# Patient Record
Sex: FEMALE | Race: WHITE
[De-identification: names, ages, dates, MRNs, and addresses within clinical notes are randomized per-mention and may not be internally consistent; named-entity substitution may affect disease eponyms.]

---

## 2019-01-13 ENCOUNTER — HOSPITAL ENCOUNTER (EMERGENCY)
Dept: HOSPITAL 97 - ER | Age: 84
Discharge: HOME | End: 2019-01-13
Payer: COMMERCIAL

## 2019-01-13 VITALS — DIASTOLIC BLOOD PRESSURE: 68 MMHG | SYSTOLIC BLOOD PRESSURE: 127 MMHG

## 2019-01-13 VITALS — TEMPERATURE: 97.9 F | OXYGEN SATURATION: 100 %
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
## 2019-01-13 DIAGNOSIS — F32.9: ICD-10-CM

## 2019-01-13 DIAGNOSIS — Z86.73: ICD-10-CM
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
## 2019-01-13 DIAGNOSIS — Z88.6: ICD-10-CM
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
## 2019-01-13 DIAGNOSIS — S10.11XA: Primary | ICD-10-CM

## 2019-01-13 LAB
BUN BLD-MCNC: 17 MG/DL (ref 7–18)
GLUCOSE SERPLBLD-MCNC: 84 MG/DL (ref 74–106)
HCT VFR BLD CALC: 39.4 % (ref 36–45)
LYMPHOCYTES # SPEC AUTO: 1.4 K/UL (ref 0.7–4.9)
PMV BLD: 8.9 FL (ref 7.6–11.3)
POTASSIUM SERPL-SCNC: 3.8 MMOL/L (ref 3.5–5.1)
RBC # BLD: 4.21 M/UL (ref 3.86–4.86)
TROPONIN (EMERG DEPT USE ONLY): < 0.02 NG/ML (ref 0–0.04)

## 2019-01-13 PROCEDURE — 80048 BASIC METABOLIC PNL TOTAL CA: CPT

## 2019-01-13 PROCEDURE — 71045 X-RAY EXAM CHEST 1 VIEW: CPT

## 2019-01-13 PROCEDURE — 99284 EMERGENCY DEPT VISIT MOD MDM: CPT

## 2019-01-13 PROCEDURE — 93005 ELECTROCARDIOGRAM TRACING: CPT

## 2019-01-13 PROCEDURE — 84484 ASSAY OF TROPONIN QUANT: CPT

## 2019-01-13 PROCEDURE — 36415 COLL VENOUS BLD VENIPUNCTURE: CPT

## 2019-01-13 PROCEDURE — 85025 COMPLETE CBC W/AUTO DIFF WBC: CPT

## 2019-01-13 NOTE — XMS REPORT
Clinical Summary

 Created on:2019



Patient:Treva Gómez

Sex:Female

:1934

External Reference #:[id number]





Demographics







 Address  [address] [place] [address]

 

 Home Phone  [phone]

 

 Preferred Language  English

 

 Marital Status  Unknown

 

 Adventism Affiliation  Unknown

 

 Race  White

 

 Ethnic Group  Not  or 









Author







 Organization  [facility]

 

 Address  [address] [place] [address]









Support







 Name  Relationship  Address  Phone

 

 Khris Gómez  [address] [facility] [phone]



     [place] [address]  









Care Team Providers







 Name  Role  Phone

 

 Unavailable  Primary Care Provider  Unavailable









Allergies







 Active Allergy  Reactions  Severity  Noted Date  Comments

 

 Salicylates  Other (See Comments)    2016  Pt stated "bleeding"

 

 Rosuvastatin      2016  

 

 Desvenlafaxine      2016  

 

 Ezetimibe-Simvastatin      2016  







Medications







 Medication  Sig  Dispensed  Refills  Start Date  End Date  Status

 

 meclizine (ANTIVERT) 25  Take 25 mg by    0      Active



 MG tablet  mouth daily as          



   needed for          



   Dizziness.          

 

 BIOTIN ORAL  Take by mouth.    0      Active

 

 conjugated estrogens  Place vaginally.    0      Active



 (PREMARIN) 0.625 mg/gram            



 vaginal cream            

 

 DOCOSAHEXANOIC ACID/EPA  Take by mouth.    0      Active



 (FISH OIL ORAL)            

 

 GLUCOSAMINE/CHONDROITIN  Take by mouth.    0      Active



 SULF A            



 (GLUCOSAMINE-CHONDROITIN            



 ORAL)            

 

 coenzyme Q10 (COQ-10)  Take by mouth    0      Active



 100 mg capsule  daily.          

 

 CHOLECALCIFEROL, VITAMIN  Take by mouth.    0      Active



 D3, (VITAMIN D3 ORAL)            

 

 CALCIUM-MAGNESIUM-ZINC  Take by mouth.    0      Active



 ORAL            

 

 nutritional supplement -  Take by mouth.    0      Active



 fiber (JUICE PLUS) Liqd            







Active Problems







 Problem  Noted Date

 

 CVA (cerebral vascular accident)  2016







Social History







 Tobacco Use  Types  Packs/Day  Years Used  Date

 

 Never Smoker        









 Sex Assigned at Birth  Date Recorded

 

 Not on file  









 Job Start Date  Occupation  Industry

 

 Not on file  Not on file  Not on file









 Travel History  Travel Start  Travel End









 No recent travel history available.







Last Filed Vital Signs

Not on file



Plan of Treatment

Not on file



Results

Not on fileafter 2018



Insurance







 Payer  Benefit Plan / Group  Subscriber ID  Type  Phone  Address

 

 MEDICARE  MEDICARE A B  xxxxxxxxxx  Medicare    

 

 [employer] - [employer] C[employer]  [employer] TRS RETIREES  xxxxxxxxxx  HMO/POS    









 Guarantor Name  Account Type  Relation to  Date of  Phone  Billing Address



     Patient  Birth    

 

 Treva Gómez  Personal/Family  Self  1934  [phone]  [address] [place] [address] 







         ([place] [address]

## 2019-01-13 NOTE — RAD REPORT
EXAM DESCRIPTION:  RAD - Chest Single View - 1/13/2019 11:41 am

 

CLINICAL HISTORY:  CHEST PAIN

Chest pain.

 

COMPARISON:  Chest Single View dated 3/28/2017; Chest Single View dated 9/2/2016; CHEST PA AND LAT 2 
VIEW dated 6/12/2013; CHEST SINGLE VIEW dated 8/10/2012

 

FINDINGS:  Portable technique limits examination quality.

 

Mild emphysematous changes present throughout the lungs. The heart is normal in size. No displaced fr
actures.

 

IMPRESSION:  Mild COPD.

## 2019-01-13 NOTE — EDPHYS
Physician Documentation                                                                           

 [facility]                                                                

Name: Treva Gómez                                                                                

Age: 84 yrs                                                                                       

Sex: Female                                                                                       

: 1934                                                                                   

MRN: [id number]                                                                                   

Arrival Date: 2019                                                                          

Time: 10:20                                                                                       

Account#: [id number]                                                                            

Bed 4                                                                                             

Private MD: Shubham Perez V                                                                      

ED Physician Rafael Crespo                                                                       

HPI:                                                                                              

                                                                                             

12:46 This 84 yrs old  Female presents to ER via Ambulatory with complaints of       gs  

      Foreign Body In Throat.                                                                     

12:46 Onset: gradually, yesterday. Associated signs and symptoms: Pertinent positives: chest  gs  

      pain, Pertinent negatives: shortness of breath. The chest pain is described as dull.        

      Duration: The patient or guardian reports a single episode, that is now resolved.           

      Modifying factors: The symptoms are alleviated by nothing. the symptoms are aggravated      

      by nothing. Severity of pain: At its worst the pain was moderate in the emergency           

      department the pain has resolved. started after taking a pill felt like didn't go down      

      pain throat and chest resolved her for evaluation.                                          

                                                                                                  

Historical:                                                                                       

- Allergies:                                                                                      

10:37 Aspirin;                                                                                aj1 

- Home Meds:                                                                                      

10:37 Aspirin Oral [Active]; atorvastatin 80 mg Oral tab 1 tab once daily [Active]; biotin    aj1 

      Oral [Active]; Calcium with Magnesium [Active]; CoQ-10 Oral [Active]; Fish Oil Oral         

      [Active]; Glucosamine Oral [Active]; Meclizine Oral [Active]; Mirtazapine Oral              

      [Active]; Premarin [Active]; Vitamin D Oral [Active];                                       

- PMHx:                                                                                           

10:37 CVA; Depression; [place]; Vertigo;                                                          aj1 

                                                                                                  

- Immunization history:: Flu vaccine is up to date.                                               

- Social history:: Smoking status: Patient/guardian denies using tobacco.                         

- Ebola Screening: : Patient denies travel to an Ebola-affected area in the 21 days               

  before illness onset.                                                                           

                                                                                                  

                                                                                                  

ROS:                                                                                              

12:46 All other systems are negative.                                                         gs  

                                                                                                  

Exam:                                                                                             

12:46 Head/Face:  Normocephalic, atraumatic. Eyes:  Pupils equal round and reactive to light, gs  

      extra-ocular motions intact.  Lids and lashes normal.  Conjunctiva and sclera are           

      non-icteric and not injected.  Cornea within normal limits.  Periorbital areas with no      

      swelling, redness, or edema. ENT:  Nares patent. No nasal discharge, no septal              

      abnormalities noted.  Tympanic membranes are normal and external auditory canals are        

      clear.  Oropharynx with no redness, swelling, or masses, exudates, or evidence of           

      obstruction, uvula midline.  Mucous membranes moist. Neck:  Trachea midline, no             

      thyromegaly or masses palpated, and no cervical lymphadenopathy.  Supple, full range of     

      motion without nuchal rigidity, or vertebral point tenderness.  No Meningismus.             

      Chest/axilla:  Normal chest wall appearance and motion.  Nontender with no deformity.       

      No lesions are appreciated. Cardiovascular:  Regular rate and rhythm with a normal S1       

      and S2.  No gallops, murmurs, or rubs.  Normal PMI, no JVD.  No pulse deficits.             

      Respiratory:  Lungs have equal breath sounds bilaterally, clear to auscultation and         

      percussion.  No rales, rhonchi or wheezes noted.  No increased work of breathing, no        

      retractions or nasal flaring. Abdomen/GI:  Soft, non-tender, with normal bowel sounds.      

      No distension or tympany.  No guarding or rebound.  No evidence of tenderness               

      throughout. Back:  No spinal tenderness.  No costovertebral tenderness.  Full range of      

      motion. Skin:  Warm, dry with normal turgor.  Normal color with no rashes, no lesions,      

      and no evidence of cellulitis. MS/ Extremity:  Pulses equal, no cyanosis.                   

      Neurovascular intact.  Full, normal range of motion. Neuro:  Awake and alert, GCS 15,       

      oriented to person, place, time, and situation.  Cranial nerves II-XII grossly intact.      

      Motor strength 5/5 in all extremities.  Sensory grossly intact.  Cerebellar exam            

      normal.  Normal gait.                                                                       

12:46 Constitutional: The patient appears alert, awake.                                           

12:46 ECG was reviewed by the Attending Physician.                                                

                                                                                                  

Vital Signs:                                                                                      

10:37  / 87; Pulse 76; Resp 18; Temp 97.9; Pulse Ox 100% on R/A; Weight 61.23 kg (R);   aj1 

      Height 5 ft. 4 in. (162.56 cm) (R); Pain 3/10;                                              

12:04  / 68; Pulse 68; Resp 18; Pulse Ox 100% on R/A;                                   ph  

10:37 Body Mass Index 23.17 (61.23 kg, 162.56 cm)                                             aj1 

                                                                                                  

MDM:                                                                                              

11:21 Patient medically screened.                                                             gs  

12:46 Differential diagnosis: coronary artery disease chest wall pain, pharyngeal abrasion.   gs  

      Data reviewed: vital signs, nurses notes. Counseling: I had a detailed discussion with      

      the patient and/or guardian regarding: the historical points, exam findings, and any        

      diagnostic results supporting the discharge/admit diagnosis, the need for outpatient        

      follow up. Response to treatment: the patient's symptoms have resolved after treatment,     

      and as a result, I will discharge patient.                                                  

                                                                                                  

                                                                                             

11:22 Order name: Basic Metabolic Panel; Complete Time: 12:45                                   

                                                                                             

11:22 Order name: CBC with Diff; Complete Time: 12:45                                           

                                                                                             

11:22 Order name: Troponin (emerg Dept Use Only); Complete Time: 12:45                          

                                                                                             

11:22 Order name: XRAY Chest (1 view); Complete Time: 11:56                                     

                                                                                             

11:22 Order name: Cardiac monitoring; Complete Time: 11:51                                      

                                                                                             

11:22 Order name: EKG - Nurse/Tech; Complete Time: 12:24                                        

                                                                                             

11:22 Order name: IV Saline Lock; Complete Time: 11:                                          

                                                                                             

11:22 Order name: Labs collected and sent; Complete Time: 11:52                                 

                                                                                             

11:22 Order name: O2 Per Protocol; Complete Time: :52                                         

                                                                                             

11:22 Order name: O2 Sat Monitoring; Complete Time: :                                       

                                                                                                  

EC:46 Rate is 70 beats/min. Rhythm is regular. ME interval is normal. QRS interval is normal. gs  

      T waves are Normal. No ST changes noted. Clinical impression: Normal ECG. Interpreted       

      by me.                                                                                      

                                                                                                  

Administered Medications:                                                                         

No medications were administered                                                                  

                                                                                                  

                                                                                                  

Disposition:                                                                                      

19 13:01 Discharged to Home. Impression: pharyngeal abrasion.                               

- Condition is Stable.                                                                            

- Discharge Instructions: Swallowed Foreign Body, Adult.                                          

                                                                                                  

- Medication Reconciliation Form, Thank You Letter, Antibiotic Education, Prescription            

  Opioid Use form.                                                                                

- Follow up: Private Physician; When: 2 - 3 days; Reason: Re-evaluation by your                   

  physician.                                                                                      

                                                                                                  

                                                                                                  

                                                                                                  

Signatures:                                                                                       

Dispatcher [employer]                           Dorothea Soriano RN                     RN   aj1                                                  

Saniya Hubbard RN                    RN   sv                                                   

Rafael Crespo MD MD gs                                                   

                                                                                                  

Corrections: (The following items were deleted from the chart)                                    

13:19 13:01 2019 13:01 Discharged to Home. Impression: pharyngeal abrasion. Condition   sv  

      is Stable. Forms are Medication Reconciliation Form, Thank You Letter, Antibiotic           

      Education, Prescription Opioid Use. Follow up: Private Physician; When: 2 - 3 days;         

      Reason: Re-evaluation by your physician. gs                                                 

                                                                                                  

**************************************************************************************************

## 2019-01-13 NOTE — ER
Nurse's Notes                                                                                     

 [facility]                                                                

Name: Treva Gómez                                                                                

Age: 84 yrs                                                                                       

Sex: Female                                                                                       

: 1934                                                                                   

MRN: [id number]                                                                                   

Arrival Date: 2019                                                                          

Time: 10:20                                                                                       

Account#: [id number]                                                                            

Bed 4                                                                                             

Private MD: Shubham Perez V                                                                      

Diagnosis: pharyngeal abrasion                                                                    

                                                                                                  

Presentation:                                                                                     

                                                                                             

10:33 Presenting complaint: Patient states: "Yesterday about 5 I swallowed a calcium pill and aj1 

      it just seemed like it wouldn't go down and it never did go down." Reports that she         

      still has the sensation of something stuck in her throat. [facility] called EMS this      

      morning at 0700, they did an EKG, and patient declined transfer at that time. Denies        

      shortness of breath, difficulty swallowing. Transition of care: patient was not             

      received from another setting of care. Onset of symptoms was 2019 at 17:00.     

      Risk Assessment: Do you want to hurt yourself or someone else? Patient reports no           

      desire to harm self or others. Initial Sepsis Screen: Does the patient meet any 2           

      criteria? No. Patient's initial sepsis screen is negative. Does the patient have a          

      suspected source of infection? No. Patient's initial sepsis screen is negative. Care        

      prior to arrival: None.                                                                     

10:33 Method Of Arrival: Ambulatory                                                           aj1 

10:33 Acuity: RENETTA 3                                                                           aj1 

                                                                                                  

Triage Assessment:                                                                                

10:37 General: Appears in no apparent distress. comfortable, Behavior is calm, cooperative,   aj1 

      appropriate for age. Pain: Complains of pain in neck Pain currently is 3 out of 10 on a     

      pain scale. Neuro: Level of Consciousness is awake, alert, obeys commands.                  

      Cardiovascular: Patient's skin is warm and dry. Respiratory: Airway is patent               

      Respiratory effort is even, unlabored, Respiratory pattern is regular, symmetrical.         

                                                                                                  

Historical:                                                                                       

- Allergies:                                                                                      

10:37 Aspirin;                                                                                aj1 

- Home Meds:                                                                                      

10:37 Aspirin Oral [Active]; atorvastatin 80 mg Oral tab 1 tab once daily [Active]; biotin    aj1 

      Oral [Active]; Calcium with Magnesium [Active]; CoQ-10 Oral [Active]; Fish Oil Oral         

      [Active]; Glucosamine Oral [Active]; Meclizine Oral [Active]; Mirtazapine Oral              

      [Active]; Premarin [Active]; Vitamin D Oral [Active];                                       

- PMHx:                                                                                           

10:37 CVA; Depression; Cheesh-Na; Vertigo;                                                          aj1 

                                                                                                  

- Immunization history:: Flu vaccine is up to date.                                               

- Social history:: Smoking status: Patient/guardian denies using tobacco.                         

- Ebola Screening: : Patient denies travel to an Ebola-affected area in the 21 days               

  before illness onset.                                                                           

                                                                                                  

                                                                                                  

Screenin:03 Abuse screen: Denies threats or abuse. Denies injuries from another. Nutritional        ph  

      screening: No deficits noted. Tuberculosis screening: No symptoms or risk factors           

      identified. Fall Risk None identified.                                                      

                                                                                                  

Assessment:                                                                                       

11:05 General: Appears in no apparent distress. comfortable, slender, well groomed, Behavior  ph  

      is calm, cooperative, appropriate for age, Denies fever, feeling ill. Pain: Denies          

      pain. Neuro: Level of Consciousness is awake, alert, obeys commands, Oriented to            

      person, place, time, situation. Cardiovascular: Reports chest pain, last night after        

      swallowing a calcium tablet, states, " It was hurting all last night. It isn't really       

      hurting now but I figured I should come get it checked out." Pt reports slight              

      discomfort in substernal area Denies nausea, shortness of breath, vomiting, Rhythm is       

      regular. Respiratory: Airway is patent Respiratory effort is even, unlabored, Breath        

      sounds are clear bilaterally. Denies shortness of breath pain with respiration. EENT:       

      Reports pain when swallowing Denies difficulty swallowing. Derm: Skin is intact, Skin       

      is pink, warm \T\ dry. Musculoskeletal: Circulation, motion, and sensation intact. Range    

      of motion: intact in all extremities, Swelling absent.                                      

12:04 Reassessment: Patient appears in no apparent distress at this time. Patient and/or      ph  

      family updated on plan of care and expected duration. Pain level reassessed. Patient is     

      alert, oriented x 3, equal unlabored respirations, skin warm/dry/pink. Pt resting           

      quietly at this time, VSS, awaiting lab and CXR results, family at bedside.                 

                                                                                                  

Vital Signs:                                                                                      

10:37  / 87; Pulse 76; Resp 18; Temp 97.9; Pulse Ox 100% on R/A; Weight 61.23 kg (R);   aj1 

      Height 5 ft. 4 in. (162.56 cm) (R); Pain 3/10;                                              

12:04  / 68; Pulse 68; Resp 18; Pulse Ox 100% on R/A;                                   ph  

10:37 Body Mass Index 23.17 (61.23 kg, 162.56 cm)                                             aj1 

                                                                                                  

ED Course:                                                                                        

10:20 Patient arrived in ED.                                                                  sb2 

10:20 Shubham Perez MD is Private Physician.                                                 sb2 

10:35 Triage completed.                                                                       aj1 

10:37 Arm band placed on Patient placed in an exam room.                                      aj1 

10:41 Rafael Crespo MD is Attending Physician.                                              gs  

10:50 Helen Alfred RN is Primary Nurse.                                                    ph  

11:41 X-ray completed. Portable x-ray completed in exam room. Patient tolerated procedure     sg4 

      well.                                                                                       

11:42 XRAY Chest (1 view) In Process Unspecified.                                             EDMS

11:55 Initial lab(s) drawn, by me, sent to lab. Inserted saline lock: 22 gauge in left        ph  

      antecubital area, using aseptic technique. Blood collected.                                 

12:03 Patient has correct armband on for positive identification. Placed in gown. Bed in low  ph  

      position. Call light in reach. Side rails up X 1. Cardiac monitor on. Pulse ox on. NIBP     

      on. Warm blanket given.                                                                     

12:24 EKG done, by ED staff, reviewed by Rafael Crespo MD.                                    em1 

                                                                                                  

Administered Medications:                                                                         

No medications were administered                                                                  

                                                                                                  

                                                                                                  

Outcome:                                                                                          

13:01 Discharge ordered by MD.                                                                  

13:19 Patient left the ED.                                                                    sv  

                                                                                                  

Signatures:                                                                                       

Dispatcher [employer]                           EDMS                                                 

Dorothea Chaves RN RN   aj1                                                  

Saniya Hubbard RN RN sv Martinez, Eric                               em1                                                  

Helen Alfred RN RN                                                      

Rafael Crespo MD MD                                                      

Joelle Dos Santos                               sb2                                                  

Kelsey Galeano                               sg4                                                  

                                                                                                  

Corrections: (The following items were deleted from the chart)                                    

10:35 10:33 Presenting complaint: Patient states: "Yesterday about 5 I swallowed a calcium    aj1 

      pill and it just seemed like it wouldn't go down and it never did go down." Reports         

      that she still has the sensation of something stuck in her throat. [facility] called      

      EMS this morning at 0700, they did an EKG, and patient declined transfer at that time.      

      aj1                                                                                         

                                                                                                  

**************************************************************************************************

## 2019-01-14 NOTE — EKG
Test Date:    2019-01-13               Test Time:    12:05:20

Technician:   SHARMIN                                    

                                                     

MEASUREMENT RESULTS:                                       

Intervals:                                           

Rate:         70                                     

DC:           164                                    

QRSD:         70                                     

QT:           384                                    

QTc:          414                                    

Axis:                                                

P:            -21                                    

DC:           164                                    

QRS:          34                                     

T:            17                                     

                                                     

INTERPRETIVE STATEMENTS:                                       

                                                     

Normal sinus rhythm

Normal ECG

Compared to ECG 03/28/2017 02:06:34

No significant changes



Electronically Signed On 01-14-19 11:59:49 CST by Henry Estrada

## 2019-06-06 ENCOUNTER — HOSPITAL ENCOUNTER (EMERGENCY)
Dept: HOSPITAL 97 - ER | Age: 84
Discharge: HOME | End: 2019-06-06
Payer: COMMERCIAL

## 2019-06-06 VITALS — TEMPERATURE: 97.7 F | OXYGEN SATURATION: 97 %

## 2019-06-06 VITALS — SYSTOLIC BLOOD PRESSURE: 133 MMHG | DIASTOLIC BLOOD PRESSURE: 69 MMHG

## 2019-06-06 DIAGNOSIS — Z86.73: ICD-10-CM

## 2019-06-06 DIAGNOSIS — R53.1: ICD-10-CM

## 2019-06-06 DIAGNOSIS — R11.2: Primary | ICD-10-CM

## 2019-06-06 DIAGNOSIS — F32.9: ICD-10-CM

## 2019-06-06 LAB
ALBUMIN SERPL BCP-MCNC: 3.5 G/DL (ref 3.4–5)
ALP SERPL-CCNC: 43 U/L (ref 45–117)
ALT SERPL W P-5'-P-CCNC: 29 U/L (ref 12–78)
AST SERPL W P-5'-P-CCNC: 21 U/L (ref 15–37)
BUN BLD-MCNC: 19 MG/DL (ref 7–18)
GLUCOSE SERPLBLD-MCNC: 89 MG/DL (ref 74–106)
HCT VFR BLD CALC: 39 % (ref 36–45)
INR BLD: 1.05
LYMPHOCYTES # SPEC AUTO: 0.6 K/UL (ref 0.7–4.9)
MAGNESIUM SERPL-MCNC: 2 MG/DL (ref 1.8–2.4)
NT-PROBNP SERPL-MCNC: 70 PG/ML (ref ?–450)
PMV BLD: 8.6 FL (ref 7.6–11.3)
POTASSIUM SERPL-SCNC: 4 MMOL/L (ref 3.5–5.1)
RBC # BLD: 4.18 M/UL (ref 3.86–4.86)
TROPONIN (EMERG DEPT USE ONLY): < 0.02 NG/ML (ref 0–0.04)

## 2019-06-06 PROCEDURE — 36415 COLL VENOUS BLD VENIPUNCTURE: CPT

## 2019-06-06 PROCEDURE — 80076 HEPATIC FUNCTION PANEL: CPT

## 2019-06-06 PROCEDURE — 93005 ELECTROCARDIOGRAM TRACING: CPT

## 2019-06-06 PROCEDURE — 87088 URINE BACTERIA CULTURE: CPT

## 2019-06-06 PROCEDURE — 80048 BASIC METABOLIC PNL TOTAL CA: CPT

## 2019-06-06 PROCEDURE — 70551 MRI BRAIN STEM W/O DYE: CPT

## 2019-06-06 PROCEDURE — 87086 URINE CULTURE/COLONY COUNT: CPT

## 2019-06-06 PROCEDURE — 85610 PROTHROMBIN TIME: CPT

## 2019-06-06 PROCEDURE — 84484 ASSAY OF TROPONIN QUANT: CPT

## 2019-06-06 PROCEDURE — 85025 COMPLETE CBC W/AUTO DIFF WBC: CPT

## 2019-06-06 PROCEDURE — 99285 EMERGENCY DEPT VISIT HI MDM: CPT

## 2019-06-06 PROCEDURE — 83735 ASSAY OF MAGNESIUM: CPT

## 2019-06-06 PROCEDURE — 81003 URINALYSIS AUTO W/O SCOPE: CPT

## 2019-06-06 PROCEDURE — 83880 ASSAY OF NATRIURETIC PEPTIDE: CPT

## 2019-06-06 PROCEDURE — 71045 X-RAY EXAM CHEST 1 VIEW: CPT

## 2019-06-06 NOTE — RAD REPORT
EXAM DESCRIPTION:  RAD - Chest Single View - 6/6/2019 11:12 am

 

CLINICAL HISTORY:  COUGH

Chest pain.

 

COMPARISON:  Chest Pa And Lat (2 Views) dated 5/9/2019; Chest Single View dated 1/13/2019; Chest Sing
le View dated 3/28/2017; Chest Single View dated 9/2/2016

 

FINDINGS:  Portable technique limits examination quality.

 

Diffuse COPD is present. The heart is normal in size. No displaced fractures.

 

IMPRESSION:  Prominent COPD.

## 2019-06-06 NOTE — EKG
Test Date:    2019-06-06               Test Time:    10:31:43

Technician:   SHANE                                     

                                                     

MEASUREMENT RESULTS:                                       

Intervals:                                           

Rate:         76                                     

DE:           164                                    

QRSD:         78                                     

QT:           366                                    

QTc:          411                                    

Axis:                                                

P:            55                                     

DE:           164                                    

QRS:          26                                     

T:            23                                     

                                                     

INTERPRETIVE STATEMENTS:                                       

                                                     

Normal sinus rhythm

Normal ECG

Compared to ECG 01/13/2019 12:05:20

No significant changes



Electronically Signed On 06-06-19 11:09:34 CDT by Seth Valverde

## 2019-06-06 NOTE — XMS REPORT
Clinical Summary

 Created on:2019



Patient:Treva Gómez

Sex:Female

:1934

External Reference #:[id number]





Demographics







 Address  [address] [place] [address]

 

 Home Phone  [phone]

 

 Preferred Language  English

 

 Marital Status  Unknown

 

 Jew Affiliation  Unknown

 

 Race  White

 

 Ethnic Group  Not  or 









Author







 Organization  [facility]

 

 Address  [address] [place] [address]









Support







 Name  Relationship  Address  Phone

 

 Khris Gómez  [address] [phone]



     [place] [address]  









Care Team Providers







 Name  Role  Phone

 

 Unavailable  Primary Care Provider  Unavailable









Allergies







 Active Allergy  Reactions  Severity  Noted Date  Comments

 

 Salicylates  Other (See Comments)    2016  Pt stated "bleeding"

 

 Rosuvastatin      2016  

 

 Desvenlafaxine      2016  

 

 Ezetimibe-Simvastatin      2016  







Medications







 Medication  Sig  Dispensed  Refills  Start Date  End Date  Status

 

 meclizine (ANTIVERT) 25  Take 25 mg by    0      Active



 MG tablet  mouth daily as          



   needed for          



   Dizziness.          

 

 BIOTIN ORAL  Take by mouth.    0      Active

 

 conjugated estrogens  Place vaginally.    0      Active



 (PREMARIN) 0.625 mg/gram            



 vaginal cream            

 

 DOCOSAHEXANOIC ACID/EPA  Take by mouth.    0      Active



 (FISH OIL ORAL)            

 

 GLUCOSAMINE/CHONDROITIN  Take by mouth.    0      Active



 SULF A            



 (GLUCOSAMINE-CHONDROITIN            



 ORAL)            

 

 coenzyme Q10 (COQ-10)  Take by mouth    0      Active



 100 mg capsule  daily.          

 

 CHOLECALCIFEROL, VITAMIN  Take by mouth.    0      Active



 D3, (VITAMIN D3 ORAL)            

 

 CALCIUM-MAGNESIUM-ZINC  Take by mouth.    0      Active



 ORAL            

 

 nutritional supplement -  Take by mouth.    0      Active



 fiber (JUICE PLUS) Liqd            







Active Problems







 Problem  Noted Date

 

 CVA (cerebral vascular accident)  2016







Social History







 Tobacco Use  Types  Packs/Day  Years Used  Date

 

 Never Smoker        









 Sex Assigned at Birth  Date Recorded

 

 Not on file  









 Job Start Date  Occupation  Industry

 

 Not on file  Not on file  Not on file









 Travel History  Travel Start  Travel End









 No recent travel history available.







Last Filed Vital Signs

Not on file



Plan of Treatment

Not on file



Results

Not on fileafter 2018



Insurance







 Payer  Benefit Plan / Group  Subscriber ID  Type  Phone  Address

 

 MEDICARE  MEDICARE A B  xxxxxxxxxx  Medicare    

 

 [employer] - [employer] C[employer]  [employer] TRS RETIREES  xxxxxxxxxx  HMO/POS    









 Guarantor Name  Account Type  Relation to  Date of  Phone  Billing Address



     Patient  Birth    

 

 Treva Gómez  Personal/Family  Self  1934  [phone]  [address] [place] [address] 







         ([place])  [place] [address]

## 2019-06-06 NOTE — RAD REPORT
EXAM DESCRIPTION:  MRI - Brain Wo Cont - 6/6/2019 11:17 am

 

CLINICAL HISTORY:  Dizziness

 

COMPARISON:  2017

 

TECHNIQUE:  Axial, sagittal, and coronal magnetic images of the brain were obtained. Contrast was not
 requested

 

FINDINGS:  Mild to moderate signal within periventricular, deep and subcortical white matter without 
significant change likely ischemic changes secondary to small vessel disease.

 

Diffusion-weighted/ADC mapping does not reveal evidence of acute infarction.

 

The ventricles are normal caliber.

 

An extra-axial fluid collection is not present

 

The sinuses and mastoids are clear.

 

IMPRESSION:  No acute abnormality displayed

## 2019-06-06 NOTE — ER
Nurse's Notes                                                                                     

 [facility]                                                                 

Name: Treva Gómez                                                                                

Age: 84 yrs                                                                                       

Sex: Female                                                                                       

: 1934                                                                                   

MRN: [id number]                                                                                   

Arrival Date: 2019                                                                          

Time: 09:44                                                                                       

Account#: [id number]                                                                            

Bed 7                                                                                             

Private MD:                                                                                       

Diagnosis: Nausea and vomiting;Weakness                                                           

                                                                                                  

Presentation:                                                                                     

                                                                                             

09:44 Presenting complaint: EMS states: pt reports having breakfast and then walking back to    

      her apartment at [place], becoming nauseated and dizzy, reports laying down for a      

      nap but unable to sleep with the nausea. Transition of care: patient was not received       

      from another setting of care. Onset of symptoms was 2019. Risk Assessment: Do      

      you want to hurt yourself or someone else? Patient reports no desire to harm self or        

      others. Initial Sepsis Screen: Does the patient meet any 2 criteria? No. Patient's          

      initial sepsis screen is negative. Does the patient have a suspected source of              

      infection? No. Patient's initial sepsis screen is negative. Care prior to arrival: None.    

09:44 Method Of Arrival: EMS: [facility]                                                  

09:44 Acuity: RENETTA 3                                                                           sg  

                                                                                                  

Historical:                                                                                       

- Allergies:                                                                                      

09:49 Aspirin;                                                                                sg  

- Home Meds:                                                                                      

11:45 Premarin 3 times weekly [Active]; mirtazapine 7.5 mg oral tab nightly [Active];         sv  

      atorvastatin 80 mg Oral tab 1 tab once daily [Active]; aspirin 81 mg oral chew once         

      daily [Active]; Pure encapsulation 2 each day [Active]; Glucosamine 1000 mg daily Oral      

      [Active]; Calcium w/ Magnesium and zinc 1 tab daily [Active]; lutein 20 mg oral tab         

      daily [Active];                                                                             

- PMHx:                                                                                           

09:49 CVA; Depression; [place]; Vertigo;                                                          sg  

                                                                                                  

- Immunization history:: Adult Immunizations up to date.                                          

- Social history:: Smoking status: Patient/guardian denies using tobacco.                         

- Ebola Screening: : Patient negative for fever greater than or equal to 101.5 degrees            

  Fahrenheit, and additional compatible Ebola Virus Disease symptoms Patient denies               

  exposure to infectious person Patient denies travel to an Ebola-affected area in the            

  21 days before illness onset No symptoms or risks identified at this time.                      

- Family history:: not pertinent.                                                                 

                                                                                                  

                                                                                                  

Vital Signs:                                                                                      

09:46 Pulse 82; Resp 16; Temp 97.7; Pulse Ox 97% on R/A;                                      sg  

09:50  / 69;                                                                            sg  

                                                                                                  

NIH Stroke Scale Scores:                                                                          

10:41 NIHSS Score: 0                                                                          jakub 

                                                                                                  

ED Course:                                                                                        

09:44 Patient arrived in ED.                                                                    

09:46 Triage completed.                                                                         

09:46 Arm band placed on.                                                                       

09:53 Edward Fernandez MD is Attending Physician.                                             jakub 

10:34 Initial lab(s) drawn, by me, sent to lab. Urine collected: clean catch specimen, clear, ms  

      Amount Voided: 40mL EKG done, by ED staff, reviewed by Edward Fernandez MD. Inserted          

      saline lock: 20 gauge in right antecubital area, using aseptic technique. Blood             

      collected.                                                                                  

10:38 EKG done, by EKG tech. reviewed by Edward Fernandez MD.                                   3 

11:10 Brain Wo Cont In Process Unspecified.                                                   EDMS

11:11 X-ray completed. Portable x-ray completed in exam room. Patient tolerated procedure     mh1 

      well.                                                                                       

11:14 XRAY Chest (1 view) In Process Unspecified.                                             EDMS

11:17 Patient moved to MRI via wheelchair.                                                      

11:54 Repeat EKG was done.                                                                    3 

13:08 Raz Pereira, RN is Primary Nurse.                                                       sg  

                                                                                                  

Administered Medications:                                                                         

10:31 Drug: NS 0.9% 500 ml Route: IV; Rate: bolus; Site: right antecubital;                   sv  

11:00 Follow up: Response: No adverse reaction; IV Status: Completed infusion; IV Intake:     sv  

      500ml                                                                                       

10:35 Drug: NS 0.9% 1000 ml Route: IV; Rate: 125 ml/hr; Site: right antecubital;              sv  

                                                                                                  

                                                                                                  

Intake:                                                                                           

11:00 IV: 500ml; Total: 500ml.                                                                sv  

                                                                                                  

Outcome:                                                                                          

12:23 Discharge ordered by MD.                                                                jakub 

13:09 Patient left the ED.                                                                      

                                                                                                  

                                                                                                  

NIH Stroke Scale - NIH Stroke Score                                                               

Date: 2019                                                                                  

Time: 10:41                                                                                       

Total Score = 0                                                                                   

  1a. Level of Consciousness (LOC) - 0(Alert)                                                     

  1b. Level of Consciousness (LOC) (Year \T\ Age) - 0(Both)                                       

  1c. LOC Commands (Open \T\ Closes Eyes/) - 0(Both)                                          

   2. Best Gaze (Lateral Gaze Paresis) - 0(Normal)                                                

   3. Visual Field Loss - 0(No visual loss)                                                       

   4. Facial Palsy - 0(Normal)                                                                    

  5a. Left Arm: Motor (10-second hold) - 0(No drift)                                              

  5b. Right Arm: Motor (10-second hold) - 0(No drift)                                             

  6a. Left Leg: Motor (5-second hold - always test supine) - 0(No drift)                          

  6b. Right Leg: Motor (5-second hold - always test supine) - 0(No drift)                         

   7. Limb Ataxia (finger/nose \T\ heel/shin - test with eyes open) - 0(Absent)                   

   8. Sensory Loss (pinprick arms/legs/face) - 0(Normal)                                          

   9. Best Language: Aphasia (description/naming/reading) - 0(No aphasia)                         

  10. Dysarthria (speech clarity - read or repeat words) - 0(Normal)                              

  11. Extinction and Inattention (visual/tactile/auditory/spatial/personal) - 0(No                

      abnormality)                                                                                

Initials: jakub                                                                                     

                                                                                                  

Signatures:                                                                                       

Dispatcher [employer]                           Saniya Andrew RN RN sv Gay, Steven, RN RN sg Anderson, Corey, MD MD cha Compean, Lorena lc Harvey, Martha                               1                                                  

Domi Durand ms, Sierra                              3                                                  

                                                                                                  

**************************************************************************************************

## 2019-06-06 NOTE — EDPHYS
Physician Documentation                                                                           

 [facility]                                                                 

Name: Treva Gómez                                                                                

Age: 84 yrs                                                                                       

Sex: Female                                                                                       

: 1934                                                                                   

MRN: [id number]                                                                                   

Arrival Date: 2019                                                                          

Time: 09:44                                                                                       

Account#: [id number]                                                                            

Bed 7                                                                                             

Private MD:                                                                                       

ED Physician Edward Fernandez                                                                      

HPI:                                                                                              

                                                                                             

10:41 This 84 yrs old  Female presents to ER via EMS with complaints of              jakub 

      Nausea/Vomiting.                                                                            

10:41 The patient presents to the emergency department with nausea, vomiting. Onset: The      jakub 

      symptoms/episode began/occurred this morning, today. Possible causes: unknown. The          

      symptoms are aggravated by movement, The symptoms are alleviated by nothing. Associated     

      signs and symptoms: Pertinent positives: nausea, vomiting. Severity of symptoms: At         

      their worst the symptoms were. The patient has not experienced similar symptoms in the      

      past.                                                                                       

                                                                                                  

Historical:                                                                                       

- Allergies:                                                                                      

09:49 Aspirin;                                                                                sg  

- Home Meds:                                                                                      

11:45 Premarin 3 times weekly [Active]; mirtazapine 7.5 mg oral tab nightly [Active];         sv  

      atorvastatin 80 mg Oral tab 1 tab once daily [Active]; aspirin 81 mg oral chew once         

      daily [Active]; Pure encapsulation 2 each day [Active]; Glucosamine 1000 mg daily Oral      

      [Active]; Calcium w/ Magnesium and zinc 1 tab daily [Active]; lutein 20 mg oral tab         

      daily [Active];                                                                             

- PMHx:                                                                                           

09:49 CVA; Depression; [place]; Vertigo;                                                          sg  

                                                                                                  

- Immunization history:: Adult Immunizations up to date.                                          

- Social history:: Smoking status: Patient/guardian denies using tobacco.                         

- Ebola Screening: : Patient negative for fever greater than or equal to 101.5 degrees            

  Fahrenheit, and additional compatible Ebola Virus Disease symptoms Patient denies               

  exposure to infectious person Patient denies travel to an Ebola-affected area in the            

  21 days before illness onset No symptoms or risks identified at this time.                      

- Family history:: not pertinent.                                                                 

                                                                                                  

                                                                                                  

ROS:                                                                                              

10:41 Constitutional: Negative for fever, chills, and weight loss, Eyes: Negative for injury, jakub 

      pain, redness, and discharge, ENT: Negative for injury, pain, and discharge, Neck:          

      Negative for injury, pain, and swelling, Cardiovascular: Negative for chest pain,           

      palpitations, and edema, Respiratory: Negative for shortness of breath, cough,              

      wheezing, and pleuritic chest pain, Back: Negative for injury and pain, : Negative        

      for injury, bleeding, discharge, and swelling, MS/Extremity: Negative for injury and        

      deformity, Skin: Negative for injury, rash, and discoloration, Neuro: Negative for          

      headache, weakness, numbness, tingling, and seizure, Psych: Negative for depression,        

      anxiety, suicide ideation, homicidal ideation, and hallucinations, Allergy/Immunology:      

      Negative for hives, rash, and allergies, Endocrine: Negative for neck swelling,             

      polydipsia, polyuria, polyphagia, and marked weight changes, Hematologic/Lymphatic:         

      Negative for swollen nodes, abnormal bleeding, and unusual bruising.                        

10:41 Abdomen/GI: Positive for nausea and vomiting.                                               

                                                                                                  

Exam:                                                                                             

10:41 Constitutional:  This is a well developed, well nourished patient who is awake, alert,  jakub 

      and in no acute distress. Head/Face:  Normocephalic, atraumatic. Eyes:  Pupils equal        

      round and reactive to light, extra-ocular motions intact.  Lids and lashes normal.          

      Conjunctiva and sclera are non-icteric and not injected.  Cornea within normal limits.      

      Periorbital areas with no swelling, redness, or edema. ENT:  Nares patent. No nasal         

      discharge, no septal abnormalities noted.  Tympanic membranes are normal and external       

      auditory canals are clear.  Oropharynx with no redness, swelling, or masses, exudates,      

      or evidence of obstruction, uvula midline.  Mucous membranes moist. Neck:  Trachea          

      midline, no thyromegaly or masses palpated, and no cervical lymphadenopathy.  Supple,       

      full range of motion without nuchal rigidity, or vertebral point tenderness.  No            

      Meningismus. Chest/axilla:  Normal chest wall appearance and motion.  Nontender with no     

      deformity.  No lesions are appreciated. Cardiovascular:  Regular rate and rhythm with a     

      normal S1 and S2.  No gallops, murmurs, or rubs.  Normal PMI, no JVD.  No pulse             

      deficits. Respiratory:  Lungs have equal breath sounds bilaterally, clear to                

      auscultation and percussion.  No rales, rhonchi or wheezes noted.  No increased work of     

      breathing, no retractions or nasal flaring. Abdomen/GI:  Soft, non-tender, with normal      

      bowel sounds.  No distension or tympany.  No guarding or rebound.  No evidence of           

      tenderness throughout. Back:  No spinal tenderness.  No costovertebral tenderness.          

      Full range of motion. Skin:  Warm, dry with normal turgor.  Normal color with no            

      rashes, no lesions, and no evidence of cellulitis. MS/ Extremity:  Pulses equal, no         

      cyanosis.  Neurovascular intact.  Full, normal range of motion. Neuro:  Awake and           

      alert, GCS 15, oriented to person, place, time, and situation.  Cranial nerves II-XII       

      grossly intact.  Motor strength 5/5 in all extremities.  Sensory grossly intact.            

      Cerebellar exam normal.  Normal gait. Psych:  Awake, alert, with orientation to person,     

      place and time.  Behavior, mood, and affect are within normal limits.                       

                                                                                                  

Vital Signs:                                                                                      

09:46 Pulse 82; Resp 16; Temp 97.7; Pulse Ox 97% on R/A;                                      sg  

09:50  / 69;                                                                            sg  

                                                                                                  

NIH Stroke Scale Scores:                                                                          

10:41 NIHSS Score: 0                                                                          jakub 

                                                                                                  

MDM:                                                                                              

09:53 Patient medically screened.                                                             [facility] 

10:43 Data reviewed: vital signs, nurses notes, lab test result(s), EKG, radiologic studies,  [facility] 

      CT scan, MRI, plain films.                                                                  

                                                                                                  

                                                                                             

10:08 Order name: Basic Metabolic Panel; Complete Time: 11:27                                 [facility] 

                                                                                             

10:08 Order name: CBC with Diff; Complete Time: 10:39                                         [facility] 

                                                                                             

10:08 Order name: LFT's; Complete Time: 11:27                                                 [facility] 

                                                                                             

10:08 Order name: Magnesium; Complete Time: 11:27                                             [facility] 

                                                                                             

10:08 Order name: NT PRO-BNP; Complete Time: 11:27                                            [facility] 

                                                                                             

10:08 Order name: PT-INR; Complete Time: 11:27                                                [facility] 

                                                                                             

10:08 Order name: Troponin (emerg Dept Use Only); Complete Time: 11:27                        [facility] 

                                                                                             

10:08 Order name: XRAY Chest (1 view); Complete Time: 11:27                                   [facility] 

                                                                                             

10:08 Order name: Urine Culture                                                               [facility] 

                                                                                             

10:33 Order name: Urine Dipstick--Ancillary (enter results); Complete Time: 10:39               

                                                                                             

11:29 Order name: Troponin (emerg Dept Use Only): 1135am; Complete Time: 12:17                [facility] 

                                                                                             

10:08 Order name: EKG; Complete Time: 10:13                                                   [facility] 

                                                                                             

10:08 Order name: Cardiac monitoring; Complete Time: 10:34                                    [facility] 

                                                                                             

10:08 Order name: EKG - Nurse/Tech; Complete Time: 10:34                                      [facility] 

                                                                                             

10:08 Order name: IV Saline Lock; Complete Time: 10:34                                        [facility] 

                                                                                             

10:08 Order name: Labs collected and sent; Complete Time: 10:34                               [facility] 

                                                                                             

10:08 Order name: O2 Per Protocol; Complete Time: 10:34                                       [facility] 

                                                                                             

10:08 Order name: O2 Sat Monitoring; Complete Time: 10:34                                     [facility] 

                                                                                             

10:08 Order name: Urine Dipstick-Ancillary (obtain specimen); Complete Time: 10:33            [facility] 

                                                                                             

10:46 Order name: Brain Wo Cont; Complete Time: 11:27                                         [facility]

                                                                                             

11:29 Order name: EKG; Complete Time: 11:32                                                   [facility] 

                                                                                             

11:29 Order name: EKG - Nurse/Tech; Complete Time: 11:45                                      [facility] 

                                                                                                  

Administered Medications:                                                                         

10:31 Drug: NS 0.9% 500 ml Route: IV; Rate: bolus; Site: right antecubital;                   sv  

11:00 Follow up: Response: No adverse reaction; IV Status: Completed infusion; IV Intake:     sv  

      500ml                                                                                       

10:35 Drug: NS 0.9% 1000 ml Route: IV; Rate: 125 ml/hr; Site: right antecubital;              sv  

                                                                                                  

                                                                                                  

Disposition:                                                                                      

19 12:23 Discharged to Home. Impression: Nausea and vomiting, Weakness.                     

- Condition is Stable.                                                                            

- Discharge Instructions: Weakness, Fatigue, Weakness, Easy-to-Read, Aspirin and Your             

  Heart.                                                                                          

- Prescriptions for Zofran 4 mg Oral Tablet - take 1 tablet by ORAL route every 12                

  hours As needed; 14 tablet. Meclizine 25 mg Oral Tablet - take 1 tablet by ORAL route           

  every 8 hours As needed; 21 tablet.                                                             

- Medication Reconciliation Form, Thank You Letter, Antibiotic Education, Prescription            

  Opioid Use form.                                                                                

- Follow up: Private Physician; When: 2 - 3 days; Reason: Recheck today's complaints,             

  Continuance of care, Re-evaluation by your physician.                                           

- Problem is new.                                                                                 

- Symptoms have improved.                                                                         

                                                                                                  

                                                                                                  

                                                                                                  

                                                                                                  

NIH Stroke Scale - NIH Stroke Score                                                               

Date: 2019                                                                                  

Time: 10:41                                                                                       

Total Score = 0                                                                                   

  1a. Level of Consciousness (LOC) - 0(Alert)                                                     

  1b. Level of Consciousness (LOC) (Year \T\ Age) - 0(Both)                                       

  1c. LOC Commands (Open \T\ Closes Eyes/) - 0(Both)                                          

   2. Best Gaze (Lateral Gaze Paresis) - 0(Normal)                                                

   3. Visual Field Loss - 0(No visual loss)                                                       

   4. Facial Palsy - 0(Normal)                                                                    

  5a. Left Arm: Motor (10-second hold) - 0(No drift)                                              

  5b. Right Arm: Motor (10-second hold) - 0(No drift)                                             

  6a. Left Leg: Motor (5-second hold - always test supine) - 0(No drift)                          

  6b. Right Leg: Motor (5-second hold - always test supine) - 0(No drift)                         

   7. Limb Ataxia (finger/nose \T\ heel/shin - test with eyes open) - 0(Absent)                   

   8. Sensory Loss (pinprick arms/legs/face) - 0(Normal)                                          

   9. Best Language: Aphasia (description/naming/reading) - 0(No aphasia)                         

  10. Dysarthria (speech clarity - read or repeat words) - 0(Normal)                              

  11. Extinction and Inattention (visual/tactile/auditory/spatial/personal) - 0(No                

      abnormality)                                                                                

Initials: jakub                                                                                     

                                                                                                  

Signatures:                                                                                       

Dispatcher [employer]                           [facility]                                                 

Saniya Hubbard RN RN sv Gay, Steven, RN RN sg Anderson, Corey, MD MD cha                                                  

                                                                                                  

Corrections: (The following items were deleted from the chart)                                    

10:45 10:43 MR STROKE PROTOCOL+MRI.RAD.BRZ ordered. [facility]        

13:09 12:23 2019 12:23 Discharged to Home. Impression: Nausea and vomiting;     sg          

      Weakness. Condition is Stable. Discharge Instructions: Weakness, Fatigue,                   

      Weakness, Easy-to-Read, Aspirin and Your Heart. Prescriptions for Zofran 4 mg               

      Oral Tablet - take 1 tablet by ORAL route every 12 hours As needed; 14 tablet.              

      and Forms are Medication Reconciliation Form, Thank You Letter, Antibiotic                  

      Education, Prescription Opioid Use. Follow up: Private Physician; When: 2 - 3               

      days; Reason: Recheck today's complaints, Continuance of care, Re-evaluation by             

      your physician. Problem is new. Symptoms have improved. jakub                                 

                                                                                                  

**************************************************************************************************

## 2019-06-07 NOTE — EKG
Test Date:    2019-06-06               Test Time:    11:42:34

Technician:   SHANE                                     

                                                     

MEASUREMENT RESULTS:                                       

Intervals:                                           

Rate:         79                                     

TN:           154                                    

QRSD:         78                                     

QT:           374                                    

QTc:          428                                    

Axis:                                                

P:            -18                                    

TN:           154                                    

QRS:          45                                     

T:            13                                     

                                                     

INTERPRETIVE STATEMENTS:                                       

                                                     

Normal sinus rhythm

Possible Inferior infarct, age undetermined

Abnormal ECG

Compared to ECG 06/06/2019 10:31:43

Myocardial infarct finding now present



Electronically Signed On 06-07-19 07:16:13 CDT by Seth Valverde

## 2020-10-23 ENCOUNTER — HOSPITAL ENCOUNTER (EMERGENCY)
Dept: HOSPITAL 97 - ER | Age: 85
Discharge: HOME | End: 2020-10-23
Payer: COMMERCIAL

## 2020-10-23 VITALS — SYSTOLIC BLOOD PRESSURE: 152 MMHG | DIASTOLIC BLOOD PRESSURE: 78 MMHG | TEMPERATURE: 97.9 F

## 2020-10-23 VITALS — OXYGEN SATURATION: 99 %

## 2020-10-23 DIAGNOSIS — Z79.82: ICD-10-CM

## 2020-10-23 DIAGNOSIS — Z86.73: ICD-10-CM

## 2020-10-23 DIAGNOSIS — K62.5: ICD-10-CM

## 2020-10-23 DIAGNOSIS — F32.9: ICD-10-CM

## 2020-10-23 DIAGNOSIS — N93.9: Primary | ICD-10-CM

## 2020-10-23 LAB
ALBUMIN SERPL BCP-MCNC: 3.6 G/DL (ref 3.4–5)
ALP SERPL-CCNC: 46 U/L (ref 45–117)
ALT SERPL W P-5'-P-CCNC: 32 U/L (ref 12–78)
AST SERPL W P-5'-P-CCNC: 19 U/L (ref 15–37)
BUN BLD-MCNC: 14 MG/DL (ref 7–18)
GLUCOSE SERPLBLD-MCNC: 93 MG/DL (ref 74–106)
HCT VFR BLD CALC: 37.8 % (ref 36–45)
LIPASE SERPL-CCNC: 113 U/L (ref 73–393)
LYMPHOCYTES # SPEC AUTO: 1.4 K/UL (ref 0.7–4.9)
PMV BLD: 8.3 FL (ref 7.6–11.3)
POTASSIUM SERPL-SCNC: 3.8 MMOL/L (ref 3.5–5.1)
RBC # BLD: 4.16 M/UL (ref 3.86–4.86)

## 2020-10-23 PROCEDURE — 82272 OCCULT BLD FECES 1-3 TESTS: CPT

## 2020-10-23 PROCEDURE — 80048 BASIC METABOLIC PNL TOTAL CA: CPT

## 2020-10-23 PROCEDURE — 74177 CT ABD & PELVIS W/CONTRAST: CPT

## 2020-10-23 PROCEDURE — 80076 HEPATIC FUNCTION PANEL: CPT

## 2020-10-23 PROCEDURE — 85025 COMPLETE CBC W/AUTO DIFF WBC: CPT

## 2020-10-23 PROCEDURE — 36415 COLL VENOUS BLD VENIPUNCTURE: CPT

## 2020-10-23 PROCEDURE — 81003 URINALYSIS AUTO W/O SCOPE: CPT

## 2020-10-23 PROCEDURE — 83690 ASSAY OF LIPASE: CPT

## 2020-10-23 PROCEDURE — 99284 EMERGENCY DEPT VISIT MOD MDM: CPT

## 2020-10-23 NOTE — XMS REPORT
Continuity of Care Document

                           Created on:2020



Patient:ROCK VEGA

Sex:Female

:1934

External Reference #:[id number]





Demographics







                          Address                   [address] 



                                                    [place] [address]

 

                          Home Phone                [phone]

 

                          Email Address             [email or web address]

 

                          Preferred Language        en

 

                          Marital Status            Unknown

 

                          Muslim Affiliation     Unknown

 

                          Race                      Unknown

 

                          Additional Race(s)        White

 

                          Ethnic Group              Unknown









Author







                          Organization              [facility]

t

 

                          Address                   [address]



                                                    [place] [address]

 

                          Phone                     [phone]









Support







                Name            Relationship    Address         Phone

 

                Rico           Unavailable     [address]  [phone]



                                                [place] [address] 

 

                RICO           Unavailable     [address]  [phone]



                                                [place] [address] 

 

                Rico           [address]  [phone]



                                                [place] [address] 









Care Team Providers







                    Name                Role                Phone

 

                    Unavailable         Unavailable         Unavailable









Problems







       Condition Condition Condition Status Onset  Resolution Last   Treating Co

mments 

Source



       Name   Details Category        Date   Date   Treatment Clinician        



                                                 Date                 

 

       CVA    CVA    Disease Active                              [facility]



       (cerebral (cerebral                                              [place]

s -



       vascular vascular               00:00:                             [facility]

l



       accident) accident)               00                                 Cent

er







Allergies, Adverse Reactions, Alerts







       Allergy Allergy Status Severity Reaction(s) Onset  Inactive Treating Comm

ents 

Source



       Name   Type                        Date   Date   Clinician        

 

       Salicyla [facility]   Active        Other (See                Pt stated C

[facility]



       carlos    [facility]               Comments)                  "bleeding Katt

kes -



              nce                         00:00:               "      Medical



                                          00                          [place]

 

       Rosuvast [facility]   Active                                     [facility]



       atin   [facility]                                              [facility]



              nce                         00:00:                      [facility]



                                          00                          Center

 

       Desvenla [facility]   Active                                     [facility]



       faxine [facility]                                              [facility]



              nce                         00:00:                      Medical



                                          00                          Center

 

       Ezetimib [facility]   Active                                     [facility]



       e-Simvas [facility]                                              [facility] 

-



       tatin  nce                         00:00:                      Medical



                                          00                          [place]







Social History







           Social Habit Start Date Stop Date  Quantity   Comments   Source

 

           Sex Assigned At                                             [facility]

 

           Alcohol intake 2016                       [facility]

es -



                      00:00:00   00:00:00                         [facility] Center









                Smoking Status  Start Date      Stop Date       Source

 

                Never smoker                                    [facility]







Medications







       Ordered Filled Start  Stop   Current Ordering Indication Dosage Frequency

 Signature

                    Comments            Components          Source



     Medication Medication Date Date Medication? Clinician                (SIG) 

          



     Name Name                                                   

 

     meclizine            Yes            25mg      Take 25 mg           [facility]

[facility]



     (ANTIVERT)                                     by mouth           [facility]

 -



     25 MG      13:23:                               daily as           Medical



     tablet      49                                 needed for           Center



                                                  Dizziness.           

 

     BIOTIN ORAL            Yes                      Take by           [facility]

 St



                                              mouth.           [facility] -



               13:23:                                              Medical



               49                                                Center

 

     conjugated            Yes                      Place           [facility]



     estrogens      -03                               vaginally.           [facility]

s -



     (PREMARIN)      13:23:                                              Medical



     0.625                                                      Center



     mg/gram                                                        



     vaginal                                                        



     cream                                                        

 

     DOCOSAHEXAN            Yes                      Take by           [facility]



     OIC                                      mouth.           [facility]



     ACID/EPA      13:23:                                              Medical



     (FISH OIL      49                                                Center



     ORAL)                                                        

 

     GLUCOSAMINE            Yes                      Take by           [facility]



     /CHONDROITI                                     mouth.           [facility] 

-



     N SULF A      13:23:                                              Medical



     (GLUCOSAMIN                                                      Center



     E-CHONDROIT                                                        



     IN ORAL)                                                        

 

     coenzyme            Yes                 QD   Take by           [facility]



     Q10                                      mouth           [facility] -



     (COQ-10)      13:23:                               daily.           Medical



     100 mg                                                      Center



     capsule                                                        

 

     CHOLECALCIF            Yes                      Take by           [facility]



     FER,                                     mouth.           [facility]



     VITAMIN D3,      13:23:                                              Medica

l



     (VITAMIN D3      49                                                Center



     ORAL)                                                        

 

     CALCIUM-MAG            Yes                      Take by           [facility]



     NESIUM-ZINC                                     mouth.           [facility] 

-



     ORAL      13:23:                                              Medical



               49                                                Center

 

     nutritional            Yes                      Take by           [facility]



     supplement                                     mouth.           [facility] -



     - fiber      13:23:                                              Medical



     (JUICE                                                      Center



     PLUS) Liqd                                                        







Procedures

This patient has no known procedures.



Encounters







        Start   End     Encounter Admission Attending Care    Care    Encounter 

Source



        Date/Time Date/Time Type    Type    Clinicians Facility Department ID   

   

 

        2020 Outpatient                 [facility]  [id number]

 [facility]



        00:00:00 00:00:00                                                 Rodney rios



                                                                        Outpati



                                                                        ent



                                                                        Clinics







Results

This patient has no known results.

## 2020-10-23 NOTE — ER
Nurse's Notes                                                                                     

 [facility]                                                                 

Name: Treva Gómez                                                                                

Age: 86 yrs                                                                                       

Sex: Female                                                                                       

: 1934                                                                                   

MRN: [id number]                                                                                   

Arrival Date: 10/23/2020                                                                          

Time: 07:20                                                                                       

Account#: [id number]                                                                            

Bed 17                                                                                            

Private MD:                                                                                       

Diagnosis: Abnormal uterine and vaginal bleeding, unspecified                                     

                                                                                                  

Presentation:                                                                                     

10/23                                                                                             

07:33 Chief complaint: Patient states: started having bright red rectal bleeding yesterday    sv  

      and then might be having vaginal bleeding. Pt is unsure where its coming from. Reports      

      constipation. Denies abd pain/n/v/d. Reports that she has been taking ASA recently.         

      Coronavirus screen: Client denies travel out of the [place] in the last 14 days. At this       

      time, the client does not indicate any symptoms associated with coronavirus-19. Ebola       

      Screen: No symptoms or risks identified at this time. Risk Assessment: Do you want to       

      hurt yourself or someone else? Patient reports no desire to harm self or others. Onset      

      of symptoms was 2020.                                                           

07:33 Method Of Arrival: Ambulatory                                                           sv  

07:33 Acuity: RENETTA 3                                                                           sv  

07:48 Initial Sepsis Screen: Does the patient meet any 2 criteria? No. Patient's initial      ph  

      sepsis screen is negative. Does the patient have a suspected source of infection? No.       

      Patient's initial sepsis screen is negative.                                                

                                                                                                  

Historical:                                                                                       

- Allergies:                                                                                      

07:34 Aspirin;                                                                                sv  

- Home Meds:                                                                                      

07:59 aspirin 81 mg Oral chew once daily [Active]; Premarin 3 times weekly [Active];          sv  

      mirtazapine 7.5 mg Oral tab 0.5 tabs nightly [Active]; atorvastatin 80 mg Oral tab 1        

      tab once daily [Active]; Glucosamine 1000 mg daily Oral [Active]; biotin Oral [Active];     

      Calcium w/ Magnesium and zinc 1 tab daily [Active]; Myrbetriq oral oral [Active];           

      Meclizine Oral [Active]; pantoprazole oral oral [Active]; Gaviscon Oral [Active]; Pure      

      encapsulation 2 each day [Active];                                                          

- PMHx:                                                                                           

07:34 CVA; Depression; [place]; Vertigo;                                                          sv  

                                                                                                  

- Immunization history:: Adult Immunizations up to date.                                          

- Social history:: Smoking status: Patient denies any tobacco usage or history of.                

                                                                                                  

                                                                                                  

Screenin:40 Abuse screen: Denies threats or abuse. Denies injuries from another. Nutritional        ph  

      screening: No deficits noted. Tuberculosis screening: No symptoms or risk factors           

      identified. Fall Risk None identified.                                                      

                                                                                                  

Assessment:                                                                                       

07:46 General: Appears in no apparent distress. comfortable, slender, well groomed, Behavior  ph  

      is calm, cooperative, appropriate for age, Denies fever, feeling ill. Pain: Denies          

      pain. Neuro: Level of Consciousness is awake, alert, obeys commands, Oriented to            

      person, place, time, situation. Cardiovascular: Capillary refill < 3 seconds in             

      bilateral fingers Patient's skin is warm and dry. Respiratory: Airway is patent             

      Respiratory effort is even, unlabored. GI: Abdomen is non-distended, Abd is soft and        

      non tender X 4 quads. Reports rectal bleeding, Patient currently denies abdominal pain,     

      diarrhea, nausea, vomiting. : Reports possible vaginal bleeding but is unsure. Derm:      

      Skin is intact, Skin is pink, warm \T\ dry. Musculoskeletal: Circulation, motion, and       

      sensation intact. Range of motion: intact in all extremities.                               

09:00 Reassessment: Patient appears in no apparent distress at this time. Patient and/or      ph  

      family updated on plan of care and expected duration. Pain level reassessed. Patient is     

      alert, oriented x 3, equal unlabored respirations, skin warm/dry/pink.                      

10:05 Reassessment: Patient appears in no apparent distress at this time. Patient and/or      ph  

      family updated on plan of care and expected duration. Pain level reassessed. Patient is     

      alert, oriented x 3, equal unlabored respirations, skin warm/dry/pink.                      

11:15 Reassessment: Patient appears in no apparent distress at this time. Patient and/or      ph  

      family updated on plan of care and expected duration. Pain level reassessed. Patient is     

      alert, oriented x 3, equal unlabored respirations, skin warm/dry/pink.                      

                                                                                                  

Vital Signs:                                                                                      

07:33 Pulse 80; Resp 16; Pulse Ox 96% ; Weight 57.15 kg; Height 5 ft. 2 in. (157.48 cm);      sv  

07:38 Temp 98.8(O);                                                                           mh5 

07:40  / 74;                                                                            ph  

08:27  / 68; Pulse 68; Resp 15; Temp 97.8(O); Pulse Ox 98% on R/A;                      mh5 

10:04  / 71; Pulse 71; Resp 16; Pulse Ox 99% on R/A;                                    mh5 

11:30  / 78; Pulse 70; Resp 18; Temp 97.9; Pulse Ox 99% on R/A;                         ph  

07:33 Body Mass Index 23.05 (57.15 kg, 157.48 cm)                                             sv  

                                                                                                  

ED Course:                                                                                        

07:20 Patient arrived in ED.                                                                  bp1 

07:28 Brian Sierra MD is Attending Physician.                                              kdr 

07:34 Triage completed.                                                                       sv  

07:34 Arm band placed on Patient placed in an exam room, on a stretcher.                      sv  

07:39 Patient has correct armband on for positive identification. Placed in gown. Bed in low  mh5 

      position. Call light in reach. Side rails up X 1. Adult w/ patient. Warm blanket given.     

      Cardiac monitor on. Pulse ox on. NIBP on.                                                   

07:40 Helen Alfred, RN is Primary Nurse.                                                    ph  

08:22 Basic Metabolic Panel Sent.                                                             mh5 

08:22 CBC with Diff Sent.                                                                     mh5 

08:22 Hepatic Function Sent.                                                                  mh5 

08:22 Lipase Sent.                                                                            mh5 

08:23 Initial lab(s) drawn, by me, sent to lab. T\T\S collected, blood band applied to patient. mh5

      Inserted saline lock: 20 gauge in left antecubital area, using aseptic technique.           

10:48 CT Abd/Pelvis - IV Contrast Only In Process Unspecified.                                EDMS

11:38 Shubham Perez MD is Referral Physician.                                                kdr 

11:58 No provider procedures requiring assistance completed. IV discontinued, intact,         ph  

      bleeding controlled, No redness/swelling at site. Pressure dressing applied.                

                                                                                                  

Administered Medications:                                                                         

No medications were administered                                                                  

                                                                                                  

                                                                                                  

Outcome:                                                                                          

11:35 Discharge ordered by MD.                                                                kdr 

11:58 Discharged to home via wheelchair, with family.                                         ph  

11:58 Condition: good                                                                             

11:58 Discharge instructions given to patient, family, Instructed on discharge instructions,      

      follow up and referral plans. Demonstrated understanding of instructions, follow-up         

      care.                                                                                       

11:59 Patient left the ED.                                                                    ph  

                                                                                                  

Signatures:                                                                                       

Dispatcher [employer]                           EDMS                                                 

Saniya Hubbard RN RN                                                      

Brian Sierra MD MD kdr Hall, Patricia, RN RN ph Martinez, Maria                              [facility]                                                  

Meg Gregorio                           [facility]                                                  

                                                                                                  

Corrections: (The following items were deleted from the chart)                                    

07:35 07:33 Chief complaint: Patient states: started having bright red rectal bleeding        sv  

      yesterday and then might be having vaginal bleeding. Pt is unsure where its coming          

      from. Reports constipation. Denies abd pain/n/v/d. sv                                       

                                                                                                  

**************************************************************************************************

## 2020-10-23 NOTE — EDPHYS
Physician Documentation                                                                           

 [facility]                                                                 

Name: Treva Gómez                                                                                

Age: 86 yrs                                                                                       

Sex: Female                                                                                       

: 1934                                                                                   

MRN: [id number]                                                                                   

Arrival Date: 10/23/2020                                                                          

Time: 07:20                                                                                       

Account#: [id number]                                                                            

Bed 17                                                                                            

Private MD:                                                                                       

ED Physician Brian Sierra                                                                       

HPI:                                                                                              

10/23                                                                                             

18:07 This 86 yrs old  Female presents to ER via Ambulatory with complaints of       kdr 

      Internal Bleeding.                                                                          

18:07 The patient has noted bleeding from her rectum or vagina. She is not entirely sure      kdr 

      where it was coming from. Onset: The symptoms/episode began/occurred 2 day(s) ago.          

      Severity of symptoms: At their worst the symptoms were mild in the emergency department     

      the symptoms are unchanged. The patient has not experienced similar symptoms in the         

      past. The patient has not recently seen a physician.                                        

                                                                                                  

Historical:                                                                                       

- Allergies:                                                                                      

07:34 Aspirin;                                                                                sv  

- Home Meds:                                                                                      

07:59 aspirin 81 mg Oral chew once daily [Active]; Premarin 3 times weekly [Active];          sv  

      mirtazapine 7.5 mg Oral tab 0.5 tabs nightly [Active]; atorvastatin 80 mg Oral tab 1        

      tab once daily [Active]; Glucosamine 1000 mg daily Oral [Active]; biotin Oral [Active];     

      Calcium w/ Magnesium and zinc 1 tab daily [Active]; Myrbetriq oral oral [Active];           

      Meclizine Oral [Active]; pantoprazole oral oral [Active]; Gaviscon Oral [Active]; Pure      

      encapsulation 2 each day [Active];                                                          

- PMHx:                                                                                           

07:34 CVA; Depression; [place] Vertigo;                                                          sv  

                                                                                                  

- Immunization history:: Adult Immunizations up to date.                                          

- Social history:: Smoking status: Patient denies any tobacco usage or history of.                

                                                                                                  

                                                                                                  

ROS:                                                                                              

18:07 Constitutional: Negative for fever, chills, and weight loss, Eyes: Negative for injury, kdr 

      pain, redness, and discharge, ENT: Negative for injury, pain, and discharge, Neck:          

      Negative for injury, pain, and swelling, Cardiovascular: Negative for chest pain,           

      palpitations, and edema, Respiratory: Negative for shortness of breath, cough,              

      wheezing, and pleuritic chest pain, Back: Negative for injury and pain, : Negative        

      for injury, bleeding, discharge, and swelling, MS/Extremity: Negative for injury and        

      deformity, Skin: Negative for injury, rash, and discoloration, Neuro: Negative for          

      headache, weakness, numbness, tingling, and seizure activity. Psych: Negative for           

      depression, anxiety, suicide ideation, homicidal ideation, and hallucinations,              

      Allergy/Immunology: Negative for hives, rash, and allergies, Endocrine: Negative for        

      neck swelling, polydipsia, polyuria, polyphagia, and marked weight changes,                 

      Hematologic/Lymphatic: Negative for swollen nodes, abnormal bleeding, and unusual           

      bruising.                                                                                   

18:07 Abdomen/GI: Positive for rectal bleeding.                                                   

18:07 : Positive for vaginal bleeding.                                                          

                                                                                                  

Exam:                                                                                             

18:07 Constitutional:  This is a well developed, well nourished patient who is awake, alert,  kdr 

      and in no acute distress. Head/Face:  Normocephalic, atraumatic. Eyes:  Pupils equal        

      round and reactive to light, extra-ocular motions intact.  Lids and lashes normal.          

      Conjunctiva and sclera are non-icteric and not injected.  Cornea within normal limits.      

      Periorbital areas with no swelling, redness, or edema. ENT:  Nares patent. No nasal         

      discharge, no septal abnormalities noted.  Tympanic membranes are normal and external       

      auditory canals are clear.  Oropharynx with no redness, swelling, or masses, exudates,      

      or evidence of obstruction, uvula midline.  Mucous membranes moist. Neck:  Trachea          

      midline, no thyromegaly or masses palpated, and no cervical lymphadenopathy.  Supple,       

      full range of motion without nuchal rigidity, or vertebral point tenderness.  No            

      Meningismus. Chest/axilla:  Normal chest wall appearance and motion.  Nontender with no     

      deformity.  No lesions are appreciated. Cardiovascular:  Regular rate and rhythm with a     

      normal S1 and S2.  No gallops, murmurs, or rubs.  Normal PMI, no JVD.  No pulse             

      deficits. Respiratory:  Lungs have equal breath sounds bilaterally, clear to                

      auscultation and percussion.  No rales, rhonchi or wheezes noted.  No increased work of     

      breathing, no retractions or nasal flaring. Back:  No spinal tenderness.  No                

      costovertebral tenderness.  Full range of motion. Skin:  Warm, dry with normal turgor.      

      Normal color with no rashes, no lesions, and no evidence of cellulitis. MS/ Extremity:      

      Pulses equal, no cyanosis.  Neurovascular intact.  Full, normal range of motion. Neuro:     

       Awake and alert, GCS 15, oriented to person, place, time, and situation.  Cranial          

      nerves II-XII grossly intact.  Motor strength 5/5 in all extremities.  Sensory grossly      

      intact.  Cerebellar exam normal.  Normal gait. Psych:  Awake, alert, with orientation       

      to person, place and time.  Behavior, mood, and affect are within normal limits.            

18:07 Abdomen/GI: Inspection: abdomen appears normal, Bowel sounds: normal, Palpation:            

      abdomen is soft and non-tender, Rectal exam: is unremarkable, rectal tone normal,           

      Stool: normal, brown, guaiac negative, hemorrhoid(s), are not appreciated.                  

                                                                                                  

Vital Signs:                                                                                      

07:33 Pulse 80; Resp 16; Pulse Ox 96% ; Weight 57.15 kg; Height 5 ft. 2 in. (157.48 cm);      sv  

07:38 Temp 98.8(O);                                                                           mh5 

07:40  / 74;                                                                            ph  

08:27  / 68; Pulse 68; Resp 15; Temp 97.8(O); Pulse Ox 98% on R/A;                      mh5 

10:04  / 71; Pulse 71; Resp 16; Pulse Ox 99% on R/A;                                    mh5 

11:30  / 78; Pulse 70; Resp 18; Temp 97.9; Pulse Ox 99% on R/A;                         ph  

07:33 Body Mass Index 23.05 (57.15 kg, 157.48 cm)                                             sv  

                                                                                                  

MDM:                                                                                              

11:35 Patient medically screened.                                                             kdr 

18:07 Data reviewed: vital signs, nurses notes, lab test result(s), radiologic studies.       kdr 

      Counseling: I had a detailed discussion with the patient and/or guardian regarding: the     

      historical points, exam findings, and any diagnostic results supporting the                 

      discharge/admit diagnosis, lab results, radiology results, the need for outpatient          

      follow up. Physician consultation: Shubham Perez MD regarding consult, patient's             

      condition, outpatient follow-up, next week.                                                 

                                                                                                  

10/23                                                                                             

07:37 Order name: Basic Metabolic Panel; Complete Time: 09:35                                 kdr 

10/23                                                                                             

07:37 Order name: CBC with Diff; Complete Time: 09:35                                         kdr 

10/23                                                                                             

07:37 Order name: Hepatic Function; Complete Time: 09:35                                      kdr 

10/23                                                                                             

07:37 Order name: Lipase; Complete Time: 09:35                                                kdr 

10/23                                                                                             

08:33 Order name: Occult Blood--Ancillary; Complete Time: 11:20                               sv  

10/23                                                                                             

07:37 Order name: IV Saline Lock; Complete Time: 08:22                                        kdr 

10/23                                                                                             

07:37 Order name: Labs collected and sent; Complete Time: :                               kdr 

10/23                                                                                             

09:11 Order name: Urine Dipstick--Ancillary (enter results); Complete Time: 11:20             eb  

10/23                                                                                             

09:36 Order name: CT Abd/Pelvis - IV Contrast Only; Complete Time: 11:20                      kdr 

                                                                                                  

Administered Medications:                                                                         

No medications were administered                                                                  

                                                                                                  

                                                                                                  

Disposition:                                                                                      

10/23/20 11:35 Discharged to Home. Impression: Abnormal uterine and vaginal bleeding,             

  unspecified.                                                                                    

- Condition is Stable.                                                                            

- Discharge Instructions: Abnormal Uterine Bleeding.                                              

                                                                                                  

- Medication Reconciliation Form, Thank You Letter form.                                          

- Follow up: Private Physician; When: 2 - 3 days; Reason: If symptoms return, Further             

  diagnostic work-up, Recheck today's complaints, Continuance of care, Re-evaluation by           

  your physician. Follow up: Shubham Perez MD; When: 2 - 3 days; Reason: If symptoms             

  return, Further diagnostic work-up, Recheck today's complaints, Continuance of care,            

  Re-evaluation by your physician.                                                                

- Problem is new.                                                                                 

- Symptoms have improved.                                                                         

                                                                                                  

                                                                                                  

                                                                                                  

Signatures:                                                                                       

Dispatcher [employer]                           EDSaniya Gaytan RN                    RN   sv                                                   

Brian Sierra MD MD   kdr                                                  

Helen Alfred RN                      RN   ph                                                   

                                                                                                  

Corrections: (The following items were deleted from the chart)                                    

10:05 08:31 Labs - recollect needed ordered.                                                ph  

11:38 11:35 10/23/2020 11:35 Discharged to Home. Impression: Abnormal uterine and vaginal     kdr 

      bleeding, unspecified. Condition is Stable. Forms are Medication Reconciliation Form,       

      Thank You Letter, Antibiotic Education, Prescription Opioid Use. Follow up: Private         

      Physician; When: 2 - 3 days; Reason: If symptoms return, Further diagnostic work-up,        

      Recheck today's complaints, Continuance of care, Re-evaluation by your physician.           

      Problem is new. Symptoms have improved. kdr                                                 

11:59 11:38 10/23/2020 11:35 Discharged to Home. Impression: Abnormal uterine and vaginal     ph  

      bleeding, unspecified. Condition is Stable. Discharge Instructions: Abnormal Uterine        

      Bleeding. Forms are Medication Reconciliation Form, Thank You Letter. Follow up:            

      Private Physician; When: 2 - 3 days; Reason: If symptoms return, Further diagnostic         

      work-up, Recheck today's complaints, Continuance of care, Re-evaluation by your             

      physician. Follow up: Shubham Perez; When: 2 - 3 days; Reason: If symptoms return,           

      Further diagnostic work-up, Recheck today's complaints, Continuance of care,                

      Re-evaluation by your physician. Problem is new. Symptoms have improved. kdr                

                                                                                                  

**************************************************************************************************

## 2020-10-23 NOTE — XMS REPORT
Summarization of Episode Note

                           Created on:2020



Patient:Treva Vega

Sex:Female

:1934

External Reference #:[id number]





Demographics







                          Address                   [address] 



                                                    [place] [address]

 

                          Home Phone                [phone]

 

                          Preferred Language        en

 

                          Marital Status            Unknown

 

                          Sabianist Affiliation     Unknown

 

                          Race                      White

 

                          Ethnic Group              Not  or 









Author







                          Organization              [facility]

 

                          Address                   [address] [facility] [address]



                                                    [place] [address]

 

                          Phone                     [phone]









Support







                Name            Relationship    Address         Phone

 

                Treva Vega   Unavailable     [address]  [phone]



                                                [place] [address] 

 

                DONI VEGA      Unavailable     [address] [facility]  [phone]



                                                [place] [address] 









Care Team Providers







                    Name                Role                Phone

 

                    Christian De La Rosa       Unavailable         [phone]









PROBLEMS

No Information



ALLERGIES

No Known Allergies



ENCOUNTERS from 1934 to 2020-10-01







             Encounter    Location     Date         Provider     Diagnosis

 

             [facility] [address] 29 Sep, 2020 Christian De La Rosa Pain

, joint,



             [facility] [address]                             shoulde

r, right



             [place], [place]                            M25.511 ; Subac

romial



                          [phone]                             bursitis of rig

ht



                                                                 shoulder joint 

M75.51



                                                                 and Bicipital



                                                                 tendinitis of r

ight



                                                                 shoulder M75.21







IMMUNIZATIONS







                Vaccine         Route           Administration Date Status

 

                Bupivicaine Hydro Unknown         2020   Administered

 

                Kenalog (Triamcinolone) Unknown         2020   Administ

ered







SOCIAL HISTORY

Tobacco Use:





                    Social History Observation Description         Date

 

                    Details (start date - stop date) Never Smoker        



Sex Assigned At Birth:





                          Social History Observation Description

 

                          Sex Assigned At Birth     Unknown



Alcohol Screen





                    Question            Answer              Notes

 

                    Did you have a drink containing alcohol in the past Yes     

            



                    year?                                   

 

                    Points              1                   

 

                    Interpretation      Negative            

 

                    How often did you have 6 or more drinks on one Never (0 poin

ts)    



                    occasion in the past year?                     

 

                    How many drinks did you have on a typical day when 1 or 2 (0

 points)   



                    you were drinking in the past year?                     

 

                    How often did you have a drink containing alcohol in Monthly

 or less (1 point) 



                    the past year?                          



Tobacco Use/Smoking





                    Question            Answer              Notes

 

                    Are you a           never smoker        

 

                    Additional Findings: Tobacco Non-User Current non-smoker  







REASON FOR REFERRAL

No Information



VITAL SIGNS







                    Height              64 in               29 Sep, 2020

 

                    Weight              130.4 lbs           29 Sep, 2020

 

                    BMI                 22.38 kg/m2         29 Sep, 2020

 

                    Blood pressure systolic 112 mm Hg           29 Sep, 2020

 

                    Blood pressure diastolic 72 mm Hg            29 Sep, 2020







MEDICATIONS







             Medication   SIG (Take, Route, Frequency, Duration) Start Date   En

d Date     Status

 

             Myrbetriq                                           Active

 

             Mirtazapine                                         Active

 

             Premarin                                            Active

 

             Pantoprazole Sodium                                        Active

 

             atorvastatin                                        Active

 

             Gaviscon                                            Active

 

             Meclizine HCl                                        Active







PROCEDURES

No Information



RESULTS

No Results



REASON FOR VISIT

New >3yrs: Rt Shoulder- xray ( walker pt)



MEDICAL (GENERAL) HISTORY







                    Type                Description         Date

 

                    Medical History     dizzy spells        

 

                    Medical History     stroke spring 2017  

 

                    Medical History     depression          

 

                    Medical History     GERD                

 

                    Surgical History    oral surgery -metal 







Goals Section

No Information



Health Concerns

No Information



MEDICAL EQUIPMENT

No Information



MENTAL STATUS

No Information



FUNCTIONAL STATUS

No Information



ASSESSMENTS







                    Encounter Date      Diagnosis           Notes

 

                    29 Sep, 2020        Pain, joint, shoulder, right (ICD-10 - M

25.511) 

 

                    29 Sep, 2020        Bicipital tendinitis of right shoulder (

ICD-10 - M75.21) 

 

                    29 Sep, 2020        Subacromial bursitis of right shoulder j

oint (ICD-10 - M75.51) 







PLAN OF TREATMENT

Treatment Notes





                    Assessment          Notes               Clinical Notes

 

                    Subacromial bursitis of right I discussed with the patient a

t 



                    shoulder joint      length her diagnosis and treatment 



                                        plan and he expressed understanding. 



                                         We will proceed with conservative 



                                        treatment measures including 



                                        corticosteroid injection and home 



                                        exercise program.  The patient 



                                        underwent right shoulder subacromial 



                                        kenalog injection without 



                                        complication.  The patient was 



                                        instructed to refrain from strenuous 



                                        activities for the next 24 hours and 



                                        to ice the area. She will return to 



                                        clinic as needed.   

 

                    Bicipital tendinitis of right discussed with the patient at 

length 



                    shoulder            her diagnosis and treatment plan and 



                                        he expressed understanding.  We will 



                                        proceed with conservative treatment 



                                        measures including corticosteroid 



                                        injection and home exercise program. 



                                         The patient underwent right 



                                        shoulder subacromial kenalog 



                                        injection without complication.  The 



                                        patient was instructed to refrain 



                                        from strenuous activities for the 



                                        next 24 hours and to ice the area. 



                                        She will return to clinic as needed. 



Treatment Notes





                          Test Name                 Order Date

 

                          X-RAY EXAM SHOULDER 2 VIEWS ([id number]) 2020-10-01



Next Appt





                                        Details

 

                                        prn Reason:







Insurance Providers







          Payer Name Payer Address Payer     Insured   Patient   Coverage  Cover

age End



                              Phone     Name      Relationship to Start Date Mikhail

e



                                                  Insured             

 

          [employer]    [address] [address] [phone] Florencia Vega                



          [place]        e                             



                    [id number]

## 2020-10-23 NOTE — RAD REPORT
EXAM DESCRIPTION:  CTAbdomen   Pelvis W Contrast - 10/23/2020 10:47 am

 

CLINICAL HISTORY:  Abdominal pain.

rectal/vaginal bleeding;Abd pain

 

COMPARISON:  No comparisons

 

TECHNIQUE:  Biphasic CT imaging of the abdomen and pelvis was performed with 100 ml non-ionic IV cont
rast.

 

All CT scans are performed using dose optimization technique as appropriate and may include automated
 exposure control or mA/KV adjustment according to patient size.

 

FINDINGS:  The lower lung fields are mildly emphysematous.

 

The liver demonstrates no focal lesion or biliary dilatation. The spleen, pancreas and kidneys are wi
thin normal limits. Mild nodularity of both adrenal glands.

 

No bowel obstruction, free air, free fluid or abscess. Sigmoid diverticulosis coli without diverticul
itis. The appendix is normal.  No evidence of significant lymphadenopathy.

 

Endometrial stripe appears dilated to 2.5 cm. In this age group, this is a worrisome finding.

 

No suspicious bony findings.

 

IMPRESSION:  Abnormally dilated endometrial stripe in an otherwise atrophic uterus. In this age group
, this degree of dilatation could indicate endometrial carcinoma and direct tissue sampling would be 
suggested.

## 2020-10-23 NOTE — XMS REPORT
Clinical Summary

                           Created on:2020



Patient:Treva Gómez

Sex:Female

:1934

External Reference #:[id number]





Demographics







                          Address                   [address] [place] [address]

 

                          Home Phone                [phone]

 

                          Preferred Language        English

 

                          Marital Status            Unknown

 

                          Yarsanism Affiliation     Unknown

 

                          Race                      White

 

                          Ethnic Group              Not  or 









Author







                          Organization              [facility]

 

                          Address                   [address] [place] [address]









Support







                Name            Relationship    Address         Phone

 

                Khris De La Fuente      [address] [phone]



                                                [place] [address] 









Care Team Providers







                    Name                Role                Phone

 

                    Unavailable         Primary Care Provider Unavailable









Allergies







             Active Allergy Reactions    Severity     Noted Date   Comments

 

             Salicylates  Other (See Comments)              2016   Pt stat

ed "bleeding"

 

             Rosuvastatin                           2016   

 

             Desvenlafaxine                           2016   

 

             Ezetimibe-Simvastatin                           2016   







Medications







          Medication Sig       Dispensed Refills   Start Date End Date  Status

 

          meclizine (ANTIVERT) 25 Take 25 mg by           0                     

        Active



          MG tablet mouth daily as                                         



                    needed for                                         



                    Dizziness.                                         

 

          BIOTIN ORAL Take by mouth.           0                             Act

francisco

 

          conjugated estrogens Place vaginally.           0                     

        Active



          (PREMARIN) 0.625 mg/gram                                              

     



          vaginal cream                                                   

 

          DOCOSAHEXANOIC ACID/EPA Take by mouth.           0                    

         Active



          (FISH OIL ORAL)                                                   

 

          GLUCOSAMINE/CHONDROITIN Take by mouth.           0                    

         Active



          SULF A                                                      



          (GLUCOSAMINE-CHONDROITIN                                              

     



          ORAL)                                                       

 

          coenzyme Q10 (COQ-10) Take by mouth           0                       

      Active



          100 mg capsule daily.                                            

 

          CHOLECALCIFEROL, VITAMIN Take by mouth.           0                   

          Active



          D3, (VITAMIN D3 ORAL)                                                 

  

 

          CALCIUM-MAGNESIUM-ZINC Take by mouth.           0                     

        Active



          ORAL                                                        

 

          nutritional supplement - Take by mouth.           0                   

          Active



          fiber (JUICE PLUS) Liqd                                               

    







Active Problems







                          Problem                   Noted Date

 

                          CVA (cerebral vascular accident) 2016







Social History







             Tobacco Use  Types        Packs/Day    Years Used   Date

 

             Never Smoker                                        









                Alcohol Use     Drinks/Week     oz/Week         Comments

 

                Not Asked                                       









                          Sex Assigned at Birth     Date Recorded

 

                          Not on file               







Last Filed Vital Signs

Not on file



Plan of Treatment

Not on file



Results

Not on fileafter 10/23/2019



Insurance







          Payer     Benefit Plan / Subscriber ID Effective Dates Phone     Addre

ss   Type



                    Group                                             

 

          MEDICARE  MEDICARE A B [id number] 1999-Present                    

 Medicare

 

          [employer] - [place] CARE [employer] TRS [id number] 2012-Presen                

     HMO/POS



                    RETIREES            t                             









           Guarantor Name Account Type Relation to Date of    Phone      Billing

 Address



                                 Patient    Birth                 

 

           Treva Gómez Personal/Family Self       1934 [phone]

[facility] 







                                                       (Home)     [place] [address]

## 2020-10-28 ENCOUNTER — HOSPITAL ENCOUNTER (OUTPATIENT)
Dept: HOSPITAL 97 - OR | Age: 85
Discharge: HOME | End: 2020-10-28
Attending: OBSTETRICS & GYNECOLOGY
Payer: COMMERCIAL

## 2020-10-28 VITALS — OXYGEN SATURATION: 100 %

## 2020-10-28 VITALS — TEMPERATURE: 98.3 F | SYSTOLIC BLOOD PRESSURE: 135 MMHG | DIASTOLIC BLOOD PRESSURE: 64 MMHG

## 2020-10-28 DIAGNOSIS — Z86.73: ICD-10-CM

## 2020-10-28 DIAGNOSIS — F32.9: ICD-10-CM

## 2020-10-28 DIAGNOSIS — Z79.82: ICD-10-CM

## 2020-10-28 DIAGNOSIS — Z80.0: ICD-10-CM

## 2020-10-28 DIAGNOSIS — K21.9: ICD-10-CM

## 2020-10-28 DIAGNOSIS — Z20.828: ICD-10-CM

## 2020-10-28 DIAGNOSIS — N95.0: Primary | ICD-10-CM

## 2020-10-28 DIAGNOSIS — N81.2: ICD-10-CM

## 2020-10-28 DIAGNOSIS — Z80.3: ICD-10-CM

## 2020-10-28 DIAGNOSIS — N95.2: ICD-10-CM

## 2020-10-28 DIAGNOSIS — E78.00: ICD-10-CM

## 2020-10-28 PROCEDURE — 0UDB8ZX EXTRACTION OF ENDOMETRIUM, VIA NATURAL OR ARTIFICIAL OPENING ENDOSCOPIC, DIAGNOSTIC: ICD-10-PCS

## 2020-10-28 PROCEDURE — 88305 TISSUE EXAM BY PATHOLOGIST: CPT

## 2020-10-28 PROCEDURE — 58558 HYSTEROSCOPY BIOPSY: CPT

## 2020-10-28 PROCEDURE — 93005 ELECTROCARDIOGRAM TRACING: CPT

## 2020-10-28 NOTE — XMS REPORT
Continuity of Care Document

                           Created on:2020



Patient:ROCK VEGA

Sex:Female

:1934

External Reference #:[id number]





Demographics







                          Address                   [address] 



                                                    [place] [address]

 

                          Home Phone                [phone]

 

                          Email Address             [email or web address]

 

                          Preferred Language        en

 

                          Marital Status            Unknown

 

                          Restorationism Affiliation     Unknown

 

                          Race                      Unknown

 

                          Additional Race(s)        White

 

                          Ethnic Group              Unknown









Author







                          Organization              [facility]

t

 

                          Address                   [address]



                                                    [place] [address]

 

                          Phone                     [phone]









Support







                Name            Relationship    Address         Phone

 

                Rico           Unavailable     [address]  [phone]



                                                [place] [address] 

 

                RICO           Unavailable     [address]  [phone]



                                                [place] [address] 

 

                Rico           [address]  [phone]



                                                [place] [address] 









Care Team Providers







                    Name                Role                Phone

 

                    Unavailable         Unavailable         Unavailable









Problems







       Condition Condition Condition Status Onset  Resolution Last   Treating Co

mments 

Source



       Name   Details Category        Date   Date   Treatment Clinician        



                                                 Date                 

 

       CVA    CVA    Disease Active                              [facility]



       (cerebral (cerebral                                              Register

s -



       vascular vascular               00:00:                             [facility]

l



       accident) accident)               00                                 Cent

er







Allergies, Adverse Reactions, Alerts







       Allergy Allergy Status Severity Reaction(s) Onset  Inactive Treating Comm

ents 

Source



       Name   Type                        Date   Date   Clinician        

 

       Salicyla [facility]   Active        Other (See                Pt stated C

[facility]



       carlos    [facility]ol[facility]               Comments)                  "bleeding Katt

kes -



              nce                         00:00:               "      Medical



                                          00                          [place]

 

       Rosuvast [facility]   Active                                     [facility]



       atin   [facility]                                              [facility]



              nce                         00:00:                      Medical



                                          00                          Center

 

       Desvenla [facility]   Active                                     [facility]



       faxine [facility]                                              [facility]



              nce                         00:00:                      [facility]



                                          00                          Center

 

       Ezetimib [facility]   Active                                     [facility]



       e-Simvas [facility]                                              [facility] 

-



       tatin  nce                         00:00:                      Medical



                                          00                          [place]







Social History







           Social Habit Start Date Stop Date  Quantity   Comments   Source

 

           Sex Assigned At                                             [facility]

 

           Alcohol intake 2016                       [facility]

es -



                      00:00:00   [facility]:00                         [facility]









                Smoking Status  Start Date      Stop Date       Source

 

                Never smoker                                    [facility]







Medications







       Ordered Filled Start  Stop   Current Ordering Indication Dosage Frequency

 Signature

                    Comments            Components          Source



     Medication Medication Date Date Medication? Clinician                (SIG) 

          



     Name Name                                                   

 

     meclizine            Yes            25mg      Take 25 mg           [facility]

[facility]



     (ANTIVERT)                                     by mouth           [facility]

 -



     25 MG      13:23:                               daily as           Medical



     tablet      49                                 needed for           Center



                                                  Dizziness.           

 

     BIOTIN ORAL            Yes                      Take by           [facility]

 St



                                              mouth.           [facility] -



               13:23:                                              Medical



               49                                                Center

 

     conjugated            Yes                      Place           [facility]



     estrogens      -03                               vaginally.           [facility]

s -



     (PREMARIN)      13:23:                                              Medical



     0.625                                                      Center



     mg/gram                                                        



     vaginal                                                        



     cream                                                        

 

     DOCOSAHEXAN            Yes                      Take by           [facility]



     OIC                                      mouth.           [facility]



     ACID/EPA      13:23:                                              Medical



     (FISH OIL      49                                                Center



     ORAL)                                                        

 

     GLUCOSAMINE            Yes                      Take by           [facility]



     /CHONDROITI                                     mouth.           [facility] 

-



     N SULF A      13:23:                                              Medical



     (GLUCOSAMIN                                                      Center



     E-CHONDROIT                                                        



     IN ORAL)                                                        

 

     coenzyme            Yes                 QD   Take by           [facility]



     Q10                                      mouth           [facility]



     (COQ-10)      13:23:                               daily.           Medical



     100 mg                                                      Center



     capsule                                                        

 

     CHOLECALCIF            Yes                      Take by           [facility]



     FER,                                     mouth.           [facility]



     VITAMIN D3,      13:23:                                              Medica

l



     (VITAMIN D3      49                                                Center



     ORAL)                                                        

 

     CALCIUM-MAG            Yes                      Take by           [facility]



     NESIUM-ZINC                                     mouth.           [facility] 

-



     ORAL      13:23:                                              Medical



               49                                                Center

 

     nutritional            Yes                      Take by           [facility]



     supplement                                     mouth.           [facility]



     - fiber      13:23:                                              Medical



     (JUICE                                                      Center



     PLUS) Liqd                                                        







Procedures

This patient has no known procedures.



Encounters







        Start   End     Encounter Admission Attending Care    Care    Encounter 

Source



        Date/Time Date/Time Type    Type    Clinicians Facility Department ID   

   

 

        2020 Outpatient                 [facility]  [id number]

 [facility]



        00:00:00 00:00:00                                                 Rodney rios



                                                                        Outpati



                                                                        ent



                                                                        Clinics







Results

This patient has no known results.

## 2020-10-28 NOTE — EKG
Test Date:    2020-10-27               Test Time:    13:33:01

Technician:   CURT                                     

                                                     

MEASUREMENT RESULTS:                                       

Intervals:                                           

Rate:         76                                     

IN:           178                                    

QRSD:         76                                     

QT:           368                                    

QTc:          414                                    

Axis:                                                

P:            72                                     

IN:           178                                    

QRS:          54                                     

T:            82                                     

                                                     

INTERPRETIVE STATEMENTS:                                       

                                                     

Normal sinus rhythm

Low voltage QRS

Nonspecific ST abnormality

Abnormal ECG

Compared to ECG 06/06/2019 11:42:34

Low QRS voltage now present

ST (T wave) deviation now present

Myocardial infarct finding no longer present



Electronically Signed On 10-28-20 12:59:16 CDT by Seth Valverde

## 2020-10-28 NOTE — XMS REPORT
Summarization of Episode Note

                           Created on:2020



Patient:Treva Vega

Sex:Female

:1934

External Reference #:[id number]





Demographics







                          Address                   [address] 



                                                    [place] [address]

 

                          Home Phone                [phone]

 

                          Preferred Language        en

 

                          Marital Status            Unknown

 

                          Episcopalian Affiliation     Unknown

 

                          Race                      White

 

                          Ethnic Group              Not  or 









Author







                          Organization              [facility]

 

                          Address                   [address] [facility] [address]



                                                    [place] [address]

 

                          Phone                     [phone]









Support







                Name            Relationship    Address         Phone

 

                Treva Vega   Unavailable     [address]  [phone]



                                                [place] [address] 

 

                DONI VEGA      Unavailable     [address] [facility]  [phone]



                                                [place] [address] 









Care Team Providers







                    Name                Role                Phone

 

                    Christian De La Rosa       Unavailable         [phone]









PROBLEMS

No Information



ALLERGIES

No Known Allergies



ENCOUNTERS from 1934 to 2020-10-01







             Encounter    Location     Date         Provider     Diagnosis

 

             [facility] [address] 29 Sep, 2020 Christian De La Rosa Pain

, joint,



             [facility] [address]                             shoulde

r, right



             [place], [place]                            M25.511 ; Subac

romial



                          [id number]                             bursitis of rig

ht



                                                                 shoulder joint 

M75.51



                                                                 and Bicipital



                                                                 tendinitis of r

ight



                                                                 shoulder M75.21







IMMUNIZATIONS







                Vaccine         Route           Administration Date Status

 

                Bupivicaine Hydro Unknown         2020   Administered

 

                Kenalog (Triamcinolone) Unknown         2020   Administ

ered







SOCIAL HISTORY

Tobacco Use:





                    Social History Observation Description         Date

 

                    Details (start date - stop date) Never Smoker        



Sex Assigned At Birth:





                          Social History Observation Description

 

                          Sex Assigned At Birth     Unknown



Alcohol Screen





                    Question            Answer              Notes

 

                    Did you have a drink containing alcohol in the past Yes     

            



                    year?                                   

 

                    Points              1                   

 

                    Interpretation      Negative            

 

                    How often did you have 6 or more drinks on one Never (0 poin

ts)    



                    occasion in the past year?                     

 

                    How many drinks did you have on a typical day when 1 or 2 (0

 points)   



                    you were drinking in the past year?                     

 

                    How often did you have a drink containing alcohol in Monthly

 or less (1 point) 



                    the past year?                          



Tobacco Use/Smoking





                    Question            Answer              Notes

 

                    Are you a           never smoker        

 

                    Additional Findings: Tobacco Non-User Current non-smoker  







REASON FOR REFERRAL

No Information



VITAL SIGNS







                    Height              64 in               29 Sep, 2020

 

                    Weight              130.4 lbs           29 Sep, 2020

 

                    BMI                 22.38 kg/m2         29 Sep, 2020

 

                    Blood pressure systolic 112 mm Hg           29 Sep, 2020

 

                    Blood pressure diastolic 72 mm Hg            29 Sep, 2020







MEDICATIONS







             Medication   SIG (Take, Route, Frequency, Duration) Start Date   En

d Date     Status

 

             Myrbetriq                                           Active

 

             Mirtazapine                                         Active

 

             Premarin                                            Active

 

             Pantoprazole Sodium                                        Active

 

             atorvastatin                                        Active

 

             Gaviscon                                            Active

 

             Meclizine HCl                                        Active







PROCEDURES

No Information



RESULTS

No Results



REASON FOR VISIT

New >3yrs: Rt Shoulder- xray ( walker pt)



MEDICAL (GENERAL) HISTORY







                    Type                Description         Date

 

                    Medical History     dizzy spells        

 

                    Medical History     stroke spring 2017  

 

                    Medical History     depression          

 

                    Medical History     GERD                

 

                    Surgical History    oral surgery -metal 







Goals Section

No Information



Health Concerns

No Information



MEDICAL EQUIPMENT

No Information



MENTAL STATUS

No Information



FUNCTIONAL STATUS

No Information



ASSESSMENTS







                    Encounter Date      Diagnosis           Notes

 

                    29 Sep, 2020        Pain, joint, shoulder, right (ICD-10 - M

25.511) 

 

                    29 Sep, 2020        Bicipital tendinitis of right shoulder (

ICD-10 - M75.21) 

 

                    29 Sep, 2020        Subacromial bursitis of right shoulder j

oint (ICD-10 - M75.51) 







PLAN OF TREATMENT

Treatment Notes





                    Assessment          Notes               Clinical Notes

 

                    Subacromial bursitis of right I discussed with the patient a

t 



                    shoulder joint      length her diagnosis and treatment 



                                        plan and he expressed understanding. 



                                         We will proceed with conservative 



                                        treatment measures including 



                                        corticosteroid injection and home 



                                        exercise program.  The patient 



                                        underwent right shoulder subacromial 



                                        kenalog injection without 



                                        complication.  The patient was 



                                        instructed to refrain from strenuous 



                                        activities for the next 24 hours and 



                                        to ice the area. She will return to 



                                        clinic as needed.   

 

                    Bicipital tendinitis of right discussed with the patient at 

length 



                    shoulder            her diagnosis and treatment plan and 



                                        he expressed understanding.  We will 



                                        proceed with conservative treatment 



                                        measures including corticosteroid 



                                        injection and home exercise program. 



                                         The patient underwent right 



                                        shoulder subacromial kenalog 



                                        injection without complication.  The 



                                        patient was instructed to refrain 



                                        from strenuous activities for the 



                                        next 24 hours and to ice the area. 



                                        She will return to clinic as needed. 



Treatment Notes





                          Test Name                 Order Date

 

                          X-RAY EXAM SHOULDER 2 VIEWS ([id number]) 2020-10-01



Next Appt





                                        Details

 

                                        prn Reason:







Insurance Providers







          Payer Name Payer Address Payer     Insured   Patient   Coverage  Cover

age End



                              Phone     Name      Relationship to Start Date Mikhail

e



                                                  Insured             

 

          [employer]    [address] [address] [phone] Florencia Vega                



          [place]        e                             



                    [id number]

## 2020-10-28 NOTE — XMS REPORT
Clinical Summary

                           Created on:2020



Patient:Treva Gómez

Sex:Female

:1934

External Reference #:[id number]





Demographics







                          Address                   [address] [place] [address]

 

                          Home Phone                [phone]

 

                          Preferred Language        English

 

                          Marital Status            Unknown

 

                          Faith Affiliation     Unknown

 

                          Race                      White

 

                          Ethnic Group              Not  or 









Author







                          Organization              [facility]

 

                          Address                   [address] [place] [address]









Support







                Name            Relationship    Address         Phone

 

                Khris De La Fuente      [address] [phone]



                                                [place] [address] 









Care Team Providers







                    Name                Role                Phone

 

                    Unavailable         Primary Care Provider Unavailable









Allergies







             Active Allergy Reactions    Severity     Noted Date   Comments

 

             Salicylates  Other (See Comments)              2016   Pt stat

ed "bleeding"

 

             Rosuvastatin                           2016   

 

             Desvenlafaxine                           2016   

 

             Ezetimibe-Simvastatin                           2016   







Medications







          Medication Sig       Dispensed Refills   Start Date End Date  Status

 

          meclizine (ANTIVERT) 25 Take 25 mg by           0                     

        Active



          MG tablet mouth daily as                                         



                    needed for                                         



                    Dizziness.                                         

 

          BIOTIN ORAL Take by mouth.           0                             Act

francisco

 

          conjugated estrogens Place vaginally.           0                     

        Active



          (PREMARIN) 0.625 mg/gram                                              

     



          vaginal cream                                                   

 

          DOCOSAHEXANOIC ACID/EPA Take by mouth.           0                    

         Active



          (FISH OIL ORAL)                                                   

 

          GLUCOSAMINE/CHONDROITIN Take by mouth.           0                    

         Active



          SULF A                                                      



          (GLUCOSAMINE-CHONDROITIN                                              

     



          ORAL)                                                       

 

          coenzyme Q10 (COQ-10) Take by mouth           0                       

      Active



          100 mg capsule daily.                                            

 

          CHOLECALCIFEROL, VITAMIN Take by mouth.           0                   

          Active



          D3, (VITAMIN D3 ORAL)                                                 

  

 

          CALCIUM-MAGNESIUM-ZINC Take by mouth.           0                     

        Active



          ORAL                                                        

 

          nutritional supplement - Take by mouth.           0                   

          Active



          fiber (JUICE PLUS) Liqd                                               

    







Active Problems







                          Problem                   Noted Date

 

                          CVA (cerebral vascular accident) 2016







Social History







             Tobacco Use  Types        Packs/Day    Years Used   Date

 

             Never Smoker                                        









                Alcohol Use     Drinks/Week     oz/Week         Comments

 

                Not Asked                                       









                          Sex Assigned at Birth     Date Recorded

 

                          Not on file               







Last Filed Vital Signs

Not on file



Plan of Treatment

Not on file



Results

Not on fileafter 10/28/2019



Insurance







          Payer     Benefit Plan / Subscriber ID Effective Dates Phone     Addre

ss   Type



                    Group                                             

 

          MEDICARE  MEDICARE A B [id number] 1999-Present                    

 Medicare

 

          AETNA - [employer] CARE AETNA TRS [id number] 2012-Presen                

     HMO/POS



                    RETIREES            t                             









           Guarantor Name Account Type Relation to Date of    Phone      Billing

 Address



                                 Patient    Birth                 

 

           Treva Gómez Personal/Family Self       1934 [phone]

[facility] 







                                                       (Home)     [place] [address]

## 2020-10-29 NOTE — OP
Date of Procedure:  10/28/2020



Surgeon:  Lona Rey MD



Preoperative Diagnoses:  Postmenopausal bleeding and prolapse.



Postoperative Diagnoses:  Postmenopausal bleeding and prolapse.  Main complaint is postmenopausal ble
eding.  The patient has known to have prolapse since last year.



Procedure Performed:  Diagnostic hysteroscopy and endometrial biopsy with a Pipelle.



Anesthesia:  MAC with paracervical block.



Specimens:  Endometrial Pipelle biopsy.



Complications:  No complications.



Drains:  No drains.



Estimated Blood Loss:  Minimal.



Condition:  Stable.  Ancef 2 g given.



Indications:  The patient is an 86-year-old woman with known history of prolapse, evaluated last year
, however, presented now with complaints of postmenopausal bleeding, for which she went to the ER wit
h sudden onset bleeding.  Her CT scan was done where there was a fluid-filled in the endometrial cavi
ty, probably tissue as well.  As she was bleeding, she was sent over here for evaluation. 



On physical examination, she had a very clear evidence of bleeding from her cervix.  No other bleedin
g was seen.  Urine was negative for any blood. 



No rectal blood was noted and the patient denied any bleeding from here as well.  After noting this d
espite the absence of a transvaginal ultrasound and with her concern for bleeding, plan was to do an 
endometrial sampling procedure to rule out atypia or malignancy of the uterus.  Given her age and her
 complex situation with a prolapse and high risk for perforation, she was taken to the OR.  All these
 were discussed with the patient's son in the room and he completely understood the risks of bleeding
, infection, perforation as she is postmenopausal.



Description Of Procedure:  After informed consent was verified this morning, she was taken back to OR
 and placed in supine fashion on the operating table.  After MAC was given, she was placed in dorsal 
lithotomy position and speculum placed to expose the cervix.  Anterior lip was injected with 1% lidoc
arnulfo mixed with 1:100,000, 5 cc was given in 4 and 8 o'clock positions of the cervicovaginal junction
.  Paracervical block was given 4 cc each and then at 3 and 9 o'clock positions 3 cc each.  Prepped w
ith Betadine x3 was done and diagnostic hysteroscope was taken and carefully placed at the external o
s.  Once carefully the fluid was opened up, the canal was followed approximately in about 1.5 cm of a
dvancement into the cervical canal.  I was able to see an opening, which clearly did not have to be o
pened up and once I went into this, there was a large amount of dark blood that drained, so drainage 
was done through the outlet channel of the hysteroscope and once this was done, still there was no cl
ear vision and it was very difficult to visualize the walls.  However, there is always a concern of t
he perforation, so after this was done, I pulled the scope back and gently took a plastic endometrial
 Pipelle and advanced it and at 7 cm, the fundus was reached, slightly less than that.  No evidence o
f perforation was noted by assessment using this Pipelle.  Scope was reintroduced and still had the s
stuart problem with visualization of the wall, so I decided to perform a biopsy without a curette, so Bright ramirez was used to perform this.  Three swipes were taken, fluid was obtained.  On the first 2, there 
was a small amount of tissue, but if this is all that I could get and that is what we would do and 
at was handed off for permanent pathology, all the instruments were removed.  Instrument, needle, and
 sponge counts were done and were correct at the end of the case.  She was recovered from anesthesia 
without any problems and taken to the PACU in stable condition.



Plan:  Follow up her pathology and discuss with her findings and order a transvaginal ultrasound for 
reassessment of the cavity now that the cavity was draining from the fluid, which is consistent with 
the CT findings.  So, we will reassess her endometrial cavity.  The transvaginal ultrasound from last
 year showed endometrial stripe of only 5.2 mm.  This was assessed even though she had no bleeding fo
r her prolapse and for ruling out any pathology in her uterus.  This is a significant change from wha
t was in the past.  The patient is also on a blood thinner.  So, the bleeding could be from this.  Sh
chrissy was on aspirin, so this could be also a source of bleeding.  We will first find out what the Pipell
e biopsy shows and if she has recurrent bleeding and inconclusive after reassessment with an ultrasou
nd, then we can discuss the various options for resampling.  She will see me in 1 week.  She has this
 appointment.  I looked for her son in the postoperative area, but I was unable to find him.  Finding
s will be discussed at the appointment.





SK/MODL

DD:  10/28/2020 22:01:35Voice ID:  [id number]

DT:  10/29/2020 00:19:23Report ID:  [id number]